# Patient Record
Sex: MALE | Race: WHITE | NOT HISPANIC OR LATINO | Employment: OTHER | URBAN - METROPOLITAN AREA
[De-identification: names, ages, dates, MRNs, and addresses within clinical notes are randomized per-mention and may not be internally consistent; named-entity substitution may affect disease eponyms.]

---

## 2017-10-10 ENCOUNTER — OFFICE VISIT (OUTPATIENT)
Dept: GASTROENTEROLOGY | Facility: CLINIC | Age: 72
End: 2017-10-10

## 2017-10-10 VITALS
DIASTOLIC BLOOD PRESSURE: 96 MMHG | HEIGHT: 70 IN | SYSTOLIC BLOOD PRESSURE: 148 MMHG | HEART RATE: 76 BPM | BODY MASS INDEX: 35.36 KG/M2 | WEIGHT: 247 LBS

## 2017-10-10 DIAGNOSIS — R10.11 RIGHT UPPER QUADRANT ABDOMINAL PAIN: Primary | ICD-10-CM

## 2017-10-10 PROCEDURE — 99213 OFFICE O/P EST LOW 20 MIN: CPT | Performed by: INTERNAL MEDICINE

## 2017-10-10 RX ORDER — ROSUVASTATIN CALCIUM 5 MG/1
TABLET, COATED ORAL
Refills: 6 | COMMUNITY
Start: 2017-09-25 | End: 2020-01-14 | Stop reason: SDUPTHER

## 2017-10-10 RX ORDER — HYDROCHLOROTHIAZIDE 25 MG/1
TABLET ORAL
Refills: 1 | Status: ON HOLD | COMMUNITY
Start: 2017-08-28 | End: 2018-10-25

## 2017-10-10 RX ORDER — FEXOFENADINE HCL 180 MG/1
180 TABLET ORAL DAILY
COMMUNITY
End: 2018-10-25

## 2017-10-10 RX ORDER — PANTOPRAZOLE SODIUM 40 MG/1
TABLET, DELAYED RELEASE ORAL
Refills: 5 | COMMUNITY
Start: 2017-09-26 | End: 2018-10-25 | Stop reason: ALTCHOICE

## 2017-10-10 NOTE — PROGRESS NOTES
Subjective   Summer Condon Jr. is a 71 y.o. male is here today for follow-up.  Chief Complaint   Patient presents with   • Abdominal Pain   • Fatigue     History of Present Illness  Gentleman with persistent right upper quadrant pain.  He even his colonoscopy was normal.  Recent CT scan demonstrated mild fatty liver but no other major abnormalities.  Patient has no dyspeptic symptoms.  However, the pain is aggravated with physical activity.    The following portions of the patient's history were reviewed and updated as appropriate: allergies, current medications, past family history, past medical history, past social history, past surgical history and problem list.    Review of Systems   Respiratory: Negative for shortness of breath.    Cardiovascular: Negative for chest pain.       Objective   Physical Exam   Constitutional: He appears well-developed and well-nourished.   Nursing note and vitals reviewed.     on physical examination he has some tenderness to light palpation over the right upper quadrants and he is very tender in his right thoracolumbar fascial junction.      Assessment/Plan   Problems Addressed this Visit        Nervous and Auditory    Right upper quadrant abdominal pain - Primary      This sounds musculoskeletal in nature to me.  I would like to get an opinion from a physical therapist.  Further workup will be dependent on their initial evaluation.

## 2018-01-22 ENCOUNTER — APPOINTMENT (OUTPATIENT)
Dept: GENERAL RADIOLOGY | Facility: HOSPITAL | Age: 73
End: 2018-01-22

## 2018-01-22 ENCOUNTER — HOSPITAL ENCOUNTER (EMERGENCY)
Facility: HOSPITAL | Age: 73
Discharge: HOME | End: 2018-01-22
Attending: EMERGENCY MEDICINE | Admitting: EMERGENCY MEDICINE

## 2018-01-22 VITALS
RESPIRATION RATE: 16 BRPM | WEIGHT: 230 LBS | HEIGHT: 69 IN | DIASTOLIC BLOOD PRESSURE: 96 MMHG | BODY MASS INDEX: 34.07 KG/M2 | TEMPERATURE: 98.7 F | SYSTOLIC BLOOD PRESSURE: 171 MMHG | OXYGEN SATURATION: 96 % | HEART RATE: 80 BPM

## 2018-01-22 DIAGNOSIS — M23.91 INTERNAL DERANGEMENT OF KNEE JOINT, RIGHT: Primary | ICD-10-CM

## 2018-01-22 PROCEDURE — 99284 EMERGENCY DEPT VISIT MOD MDM: CPT

## 2018-01-22 PROCEDURE — 73562 X-RAY EXAM OF KNEE 3: CPT

## 2018-01-22 NOTE — ED PROVIDER NOTES
" EMERGENCY DEPARTMENT ENCOUNTER    CHIEF COMPLAINT  Chief Complaint: right knee abrasion  History given by: pt  History limited by: nothing  Room Number: 38/38  PMD: Vicki Leon MD      HPI:  Pt is a 72 y.o. male who presents complaining of a right knee abrasion and pain after a mechanical fall PTA. Pt reports walking his dog who pulled abruptly causing him to trip and fall. The pain is worse when placing weight on the knee, and it \"causes his knee to buckle\". Has some small abrasions on hands.  Pt is usure of Tetanus status.    Duration:  PTA  Onset: sudden  Timing: constant  Location: right knee  Radiation: none  Quality: abrasion  Intensity/Severity: mild  Progression: unchanged  Associated Symptoms: none  Aggravating Factors: placing weight on the knee  Alleviating Factors: none  Previous Episodes: no  Treatment before arrival: none    PAST MEDICAL HISTORY  Active Ambulatory Problems     Diagnosis Date Noted   • Right upper quadrant abdominal pain 10/10/2017     Resolved Ambulatory Problems     Diagnosis Date Noted   • No Resolved Ambulatory Problems     Past Medical History:   Diagnosis Date   • Arthritis    • Cholelithiasis    • Elevated cholesterol    • GERD (gastroesophageal reflux disease)    • History of colon polyps    • Hypertension        PAST SURGICAL HISTORY  Past Surgical History:   Procedure Laterality Date   • CATARACT EXTRACTION     • CHOLECYSTECTOMY     • COLONOSCOPY  11/17/2014    Reid Yousif MD   • ELBOW ARTHROPLASTY         FAMILY HISTORY  History reviewed. No pertinent family history.    SOCIAL HISTORY  Social History     Social History   • Marital status:      Spouse name: N/A   • Number of children: N/A   • Years of education: N/A     Occupational History   • Not on file.     Social History Main Topics   • Smoking status: Former Smoker     Start date: 1968     Quit date: 1974   • Smokeless tobacco: Never Used   • Alcohol use No   • Drug use: No   • Sexual activity: Not on " file     Other Topics Concern   • Not on file     Social History Narrative       ALLERGIES  Zocor [simvastatin] and Ibuprofen    REVIEW OF SYSTEMS  Review of Systems   Constitutional: Negative for activity change, appetite change and fever.   HENT: Negative for congestion and sore throat.    Eyes: Negative.    Respiratory: Negative for cough and shortness of breath.    Cardiovascular: Negative for chest pain and leg swelling.   Gastrointestinal: Negative for abdominal pain, diarrhea and vomiting.   Endocrine: Negative.    Genitourinary: Negative for decreased urine volume and dysuria.   Musculoskeletal: Negative for neck pain.   Skin: Positive for wound (right knee abrasion). Negative for rash.   Allergic/Immunologic: Negative.    Neurological: Negative for weakness, numbness and headaches.   Hematological: Negative.    Psychiatric/Behavioral: Negative.    All other systems reviewed and are negative.      PHYSICAL EXAM  ED Triage Vitals   Temp Heart Rate Resp BP SpO2   01/22/18 0200 01/22/18 0121 01/22/18 0121 01/22/18 0121 01/22/18 0121   98.7 °F (37.1 °C) 75 16 146/109 97 %      Temp src Heart Rate Source Patient Position BP Location FiO2 (%)   01/22/18 0200 01/22/18 0206 01/22/18 0206 01/22/18 0206 --   Oral Monitor Lying Right arm        Physical Exam   Constitutional: He is oriented to person, place, and time and well-developed, well-nourished, and in no distress.   HENT:   Head: Normocephalic and atraumatic.   Eyes: EOM are normal. Pupils are equal, round, and reactive to light.   Neck: Normal range of motion. Neck supple.   Cardiovascular: Normal rate, regular rhythm and normal heart sounds.    Pulmonary/Chest: Effort normal and breath sounds normal. No respiratory distress.   Abdominal: Soft. There is no tenderness. There is no rebound and no guarding.   Musculoskeletal: Normal range of motion. He exhibits no edema.        Right knee: He exhibits effusion. He exhibits normal alignment, no LCL laxity, normal  patellar mobility and no MCL laxity. Tenderness found. Medial joint line and lateral joint line tenderness noted.   Small abrasions to the left knuckles, right elbow, and right knee.   Neurological: He is alert and oriented to person, place, and time. He has normal sensation and normal strength.   Skin: Skin is warm and dry.   Psychiatric: Mood and affect normal.   Nursing note and vitals reviewed.      RADIOLOGY  XR Knee 3 View Right   Preliminary Result   No acute fracture or dislocation.                   I ordered the above noted radiological studies. Interpreted by radiologist. Reviewed by me in PACS.       PROCEDURES  Procedures      PROGRESS AND CONSULTS  ED Course     0138 - Ordered XR Right Knee for further evaluation.    0226 - Notified pt of imaging which did not show a fracture. Discussed plan to discharge the pt with a knee immobilizer. He should follow up with an orthopedist in a couple days. Pt understands and agrees with plan, all questions addressed.      MEDICAL DECISION MAKING  Results were reviewed/discussed with the patient and they were also made aware of online access. Pt also made aware that some labs, such as cultures, will not be resulted during ER visit and follow up with PMD is necessary.     MDM  Number of Diagnoses or Management Options     Amount and/or Complexity of Data Reviewed  Tests in the radiology section of CPT®: ordered and reviewed (XR Right Knee - no acute fracture)  Decide to obtain previous medical records or to obtain history from someone other than the patient: yes  Review and summarize past medical records: yes (No admissions)           DIAGNOSIS  Final diagnoses:   Internal derangement of knee joint, right       DISPOSITION  DISCHARGE    Patient discharged in stable condition.    Reviewed implications of results, diagnosis, meds, responsibility to follow up, warning signs and symptoms of possible worsening, potential complications and reasons to return to  ER.    Patient/Family voiced understanding of above instructions.    Discussed plan for discharge, as there is no emergent indication for admission.  Pt/family is agreeable and understands need for follow up and repeat testing.  Pt is aware that discharge does not mean that nothing is wrong but it indicates no emergency is present that requires admission and they must continue care with follow-up as given below or physician of their choice.     FOLLOW-UP  REANNA Walker MD  [address] [facility]  [address]  [facility] [id number] [phone]    Schedule an appointment as soon as possible for a visit in 2 days  For follow-up    Eliceo López MD  [address]  [address]  [facility] [address]  [phone]    Schedule an appointment as soon as possible for a visit           Medication List      Stop          fexofenadine 180 MG tablet   Commonly known as:  ALLEGRA       pantoprazole 40 MG EC tablet   Commonly known as:  PROTONIX               Latest Documented Vital Signs:  As of 3:02 AM  BP- (!) 171/104 HR- 75 Temp- 98.7 °F (37.1 °C) (Oral) O2 sat- 97%    --  Documentation assistance provided by elza Carrasco for Dr. Funes.  Information recorded by the scribe was done at my direction and has been verified and validated by me.     Mario Carrasco  01/22/18 0429       Grayson Funes MD  01/24/18 9804

## 2018-01-22 NOTE — ED TRIAGE NOTES
PT arrived via ambulance and reports mechanical fall resulting in R knee pain and multiple abrasions to L hand and right elbow. States he's unable to stand or walk at all. PT is awake, alert, oriented, in no apparent distress. PT is placed in wheelchair and first look completed.

## 2018-10-25 ENCOUNTER — OFFICE VISIT (OUTPATIENT)
Dept: GASTROENTEROLOGY | Facility: CLINIC | Age: 73
End: 2018-10-25

## 2018-10-25 ENCOUNTER — HOSPITAL ENCOUNTER (OUTPATIENT)
Facility: HOSPITAL | Age: 73
Setting detail: SURGERY ADMIT
End: 2018-10-25
Attending: INTERNAL MEDICINE | Admitting: INTERNAL MEDICINE

## 2018-10-25 ENCOUNTER — HOSPITAL ENCOUNTER (INPATIENT)
Facility: HOSPITAL | Age: 73
LOS: 2 days | Discharge: HOME | End: 2018-10-27
Attending: INTERNAL MEDICINE | Admitting: INTERNAL MEDICINE

## 2018-10-25 VITALS
WEIGHT: 243 LBS | BODY MASS INDEX: 35.99 KG/M2 | HEART RATE: 100 BPM | SYSTOLIC BLOOD PRESSURE: 142 MMHG | HEIGHT: 69 IN | DIASTOLIC BLOOD PRESSURE: 100 MMHG

## 2018-10-25 DIAGNOSIS — K62.5 HEMORRHAGE OF ANUS AND RECTUM: Primary | ICD-10-CM

## 2018-10-25 PROBLEM — K92.2 GI BLEED: Status: ACTIVE | Noted: 2018-10-25

## 2018-10-25 LAB
ABO GROUP BLD: NORMAL
ALBUMIN SERPL-MCNC: 3.9 G/DL (ref 3.5–5.2)
ALBUMIN/GLOB SERPL: 1.4 G/DL
ALP SERPL-CCNC: 51 U/L (ref 39–117)
ALT SERPL W P-5'-P-CCNC: 21 U/L (ref 1–41)
ANION GAP SERPL CALCULATED.3IONS-SCNC: 14 MMOL/L
AST SERPL-CCNC: 21 U/L (ref 1–40)
BILIRUB SERPL-MCNC: 1.1 MG/DL (ref 0.1–1.2)
BLD GP AB SCN SERPL QL: NEGATIVE
BUN BLD-MCNC: 20 MG/DL (ref 8–23)
BUN/CREAT SERPL: 20 (ref 7–25)
CALCIUM SPEC-SCNC: 8.8 MG/DL (ref 8.6–10.5)
CHLORIDE SERPL-SCNC: 103 MMOL/L (ref 98–107)
CO2 SERPL-SCNC: 24 MMOL/L (ref 22–29)
CREAT BLD-MCNC: 1 MG/DL (ref 0.76–1.27)
DEPRECATED RDW RBC AUTO: 41.5 FL (ref 37–54)
ERYTHROCYTE [DISTWIDTH] IN BLOOD BY AUTOMATED COUNT: 12.9 % (ref 11.5–14.5)
GFR SERPL CREATININE-BSD FRML MDRD: 74 ML/MIN/1.73
GLOBULIN UR ELPH-MCNC: 2.7 GM/DL
GLUCOSE BLD-MCNC: 173 MG/DL (ref 65–99)
HCT VFR BLD AUTO: 39.5 % (ref 40.4–52.2)
HGB BLD-MCNC: 12.6 G/DL (ref 13.7–17.6)
INR PPP: 1.11 (ref 0.9–1.1)
MCH RBC QN AUTO: 27.9 PG (ref 27–32.7)
MCHC RBC AUTO-ENTMCNC: 31.9 G/DL (ref 32.6–36.4)
MCV RBC AUTO: 87.6 FL (ref 79.8–96.2)
PLATELET # BLD AUTO: 211 10*3/MM3 (ref 140–500)
PMV BLD AUTO: 10.7 FL (ref 6–12)
POTASSIUM BLD-SCNC: 3.4 MMOL/L (ref 3.5–5.2)
PROT SERPL-MCNC: 6.6 G/DL (ref 6–8.5)
PROTHROMBIN TIME: 14.1 SECONDS (ref 11.7–14.2)
RBC # BLD AUTO: 4.51 10*6/MM3 (ref 4.6–6)
RH BLD: POSITIVE
SODIUM BLD-SCNC: 141 MMOL/L (ref 136–145)
T&S EXPIRATION DATE: NORMAL
WBC NRBC COR # BLD: 8.38 10*3/MM3 (ref 4.5–10.7)

## 2018-10-25 PROCEDURE — 93010 ELECTROCARDIOGRAM REPORT: CPT | Performed by: INTERNAL MEDICINE

## 2018-10-25 PROCEDURE — 85018 HEMOGLOBIN: CPT | Performed by: INTERNAL MEDICINE

## 2018-10-25 PROCEDURE — 86850 RBC ANTIBODY SCREEN: CPT | Performed by: INTERNAL MEDICINE

## 2018-10-25 PROCEDURE — 86901 BLOOD TYPING SEROLOGIC RH(D): CPT | Performed by: INTERNAL MEDICINE

## 2018-10-25 PROCEDURE — 99223 1ST HOSP IP/OBS HIGH 75: CPT | Performed by: INTERNAL MEDICINE

## 2018-10-25 PROCEDURE — 85610 PROTHROMBIN TIME: CPT | Performed by: INTERNAL MEDICINE

## 2018-10-25 PROCEDURE — 93005 ELECTROCARDIOGRAM TRACING: CPT | Performed by: INTERNAL MEDICINE

## 2018-10-25 PROCEDURE — 80053 COMPREHEN METABOLIC PANEL: CPT | Performed by: INTERNAL MEDICINE

## 2018-10-25 PROCEDURE — 85027 COMPLETE CBC AUTOMATED: CPT | Performed by: INTERNAL MEDICINE

## 2018-10-25 PROCEDURE — 86900 BLOOD TYPING SEROLOGIC ABO: CPT | Performed by: INTERNAL MEDICINE

## 2018-10-25 PROCEDURE — 85014 HEMATOCRIT: CPT | Performed by: INTERNAL MEDICINE

## 2018-10-25 RX ORDER — ROSUVASTATIN CALCIUM 5 MG/1
5 TABLET, COATED ORAL DAILY
Status: DISCONTINUED | OUTPATIENT
Start: 2018-10-25 | End: 2018-10-27 | Stop reason: HOSPADM

## 2018-10-25 RX ORDER — TRAMADOL HYDROCHLORIDE 50 MG/1
50 TABLET ORAL EVERY 6 HOURS PRN
COMMUNITY
End: 2021-04-02

## 2018-10-25 RX ORDER — SODIUM CHLORIDE 0.9 % (FLUSH) 0.9 %
3-10 SYRINGE (ML) INJECTION AS NEEDED
Status: DISCONTINUED | OUTPATIENT
Start: 2018-10-25 | End: 2018-10-27 | Stop reason: HOSPADM

## 2018-10-25 RX ORDER — SODIUM CHLORIDE 0.9 % (FLUSH) 0.9 %
3 SYRINGE (ML) INJECTION EVERY 12 HOURS SCHEDULED
Status: DISCONTINUED | OUTPATIENT
Start: 2018-10-25 | End: 2018-10-27 | Stop reason: HOSPADM

## 2018-10-25 RX ORDER — MELOXICAM 15 MG/1
TABLET ORAL
Status: ON HOLD | COMMUNITY
Start: 2018-10-25 | End: 2018-10-25

## 2018-10-25 RX ORDER — NITROGLYCERIN 0.4 MG/1
0.4 TABLET SUBLINGUAL
Status: DISCONTINUED | OUTPATIENT
Start: 2018-10-25 | End: 2018-10-27 | Stop reason: HOSPADM

## 2018-10-25 RX ORDER — ONDANSETRON 2 MG/ML
4 INJECTION INTRAMUSCULAR; INTRAVENOUS EVERY 6 HOURS PRN
Status: DISCONTINUED | OUTPATIENT
Start: 2018-10-25 | End: 2018-10-27 | Stop reason: HOSPADM

## 2018-10-25 RX ORDER — TRAMADOL HYDROCHLORIDE 50 MG/1
50 TABLET ORAL EVERY 6 HOURS PRN
Status: DISCONTINUED | OUTPATIENT
Start: 2018-10-25 | End: 2018-10-27 | Stop reason: HOSPADM

## 2018-10-25 RX ADMIN — Medication 3 ML: at 21:11

## 2018-10-25 RX ADMIN — POLYETHYLENE GLYCOL 3350 255 G: 17 POWDER, FOR SOLUTION ORAL at 21:10

## 2018-10-25 NOTE — PROGRESS NOTES
"Arely Condon Jr. is a 72 y.o.. male is here today as a self referral.    Chief Complaint   Patient presents with   • Rectal Bleeding       History of Present Illness patient was in his usual state of health until very early this morning when he began to experience \"diarrhea\".  He looked down in the toilet bowl and noticed that it was actually just blood.  He had about 4 additional bloody stools, called my office and came in for examination.  He passed 1 large bloody stool that I witnessed.  He's never had something like this happen to him before.  He has not been orthostatic.  He's had no abdominal pain.  He has noticed that his blood pressure is been running higher than usual.  He has not had any chest pain or shortness of breath associated with this.  He has no prior history of coronary artery disease or pulmonary disease.      The following portions of the patient's history were reviewed and updated as appropriate: allergies, current medications, past family history, past medical history, past social history, past surgical history and problem list.    No current outpatient prescriptions on file.  Past Surgical History:   Procedure Laterality Date   • CATARACT EXTRACTION     • CHOLECYSTECTOMY     • COLONOSCOPY  11/17/2014    Reid Yousif MD   • ELBOW ARTHROPLASTY       History reviewed. No pertinent family history.  Past Medical History:   Diagnosis Date   • Arthritis    • Cholelithiasis    • Elevated cholesterol    • GERD (gastroesophageal reflux disease)    • Hemorrhoids    • History of colon polyps    • Hypertension        Review of Systems   Constitutional: Negative for appetite change, diaphoresis, fatigue, fever and unexpected weight change.   HENT: Negative for hearing loss, mouth sores, sore throat and trouble swallowing.    Eyes: Negative for pain and redness.   Respiratory: Negative for choking and shortness of breath.    Cardiovascular: Negative for chest pain and leg swelling. "   Gastrointestinal: Positive for anal bleeding and blood in stool. Negative for abdominal distention, abdominal pain, constipation, diarrhea, nausea, rectal pain and vomiting.   Genitourinary: Negative for flank pain and hematuria.   Musculoskeletal: Negative for arthralgias and joint swelling.   Skin: Negative for color change and rash.   Allergic/Immunologic: Negative for food allergies and immunocompromised state.   Neurological: Negative for dizziness, seizures and headaches.   Hematological: Does not bruise/bleed easily.   Psychiatric/Behavioral: Negative for confusion, sleep disturbance and suicidal ideas. The patient is not nervous/anxious.        Objective   Physical Exam   Constitutional: He is oriented to person, place, and time. He appears well-developed and well-nourished.   HENT:   Head: Normocephalic and atraumatic.   Nose: Nose normal.   Eyes: Pupils are equal, round, and reactive to light. Conjunctivae are normal.   Neck: Normal range of motion. Neck supple. No thyromegaly present.   Cardiovascular: Normal heart sounds.  Exam reveals no gallop and no friction rub.    No murmur heard.  Pulmonary/Chest: Effort normal and breath sounds normal.   Abdominal: Soft. Bowel sounds are normal. He exhibits no distension and no mass. There is no tenderness.   Musculoskeletal: He exhibits no edema.   Lymphadenopathy:     He has no cervical adenopathy.   Neurological: He is alert and oriented to person, place, and time.   Skin: No rash noted. No erythema.   Psychiatric: He has a normal mood and affect. His behavior is normal.   Nursing note and vitals reviewed.      Pertinent laboratory results were reviewed. , Pertinent old records were reviewed.  and Pertinent medical tests were reviewed.     Assessment/Plan   Problems Addressed this Visit        Digestive    Hemorrhage of anus and rectum - Primary    Relevant Orders    Case Request (Completed)        He is having significant GI bleeding.  This is most likely  diverticular in nature.  I will admit him at which time he will have a database established and we will begin a colonoscopy preparation.  He is tentatively on schedule for early tomorrow morning.  Because of the nature of the bleeding we would consider this potentially serious and he will be admitted to a monitored bed

## 2018-10-26 ENCOUNTER — ANESTHESIA EVENT (OUTPATIENT)
Dept: GASTROENTEROLOGY | Facility: HOSPITAL | Age: 73
End: 2018-10-26

## 2018-10-26 ENCOUNTER — ANESTHESIA (OUTPATIENT)
Dept: GASTROENTEROLOGY | Facility: HOSPITAL | Age: 73
End: 2018-10-26

## 2018-10-26 LAB
HCT VFR BLD AUTO: 32.4 % (ref 40.4–52.2)
HCT VFR BLD AUTO: 33.7 % (ref 40.4–52.2)
HCT VFR BLD AUTO: 34.6 % (ref 40.4–52.2)
HGB BLD-MCNC: 10.6 G/DL (ref 13.7–17.6)
HGB BLD-MCNC: 11.3 G/DL (ref 13.7–17.6)
HGB BLD-MCNC: 11.7 G/DL (ref 13.7–17.6)

## 2018-10-26 PROCEDURE — 0DJD8ZZ INSPECTION OF LOWER INTESTINAL TRACT, VIA NATURAL OR ARTIFICIAL OPENING ENDOSCOPIC: ICD-10-PCS | Performed by: INTERNAL MEDICINE

## 2018-10-26 PROCEDURE — 45378 DIAGNOSTIC COLONOSCOPY: CPT | Performed by: INTERNAL MEDICINE

## 2018-10-26 PROCEDURE — 85018 HEMOGLOBIN: CPT | Performed by: INTERNAL MEDICINE

## 2018-10-26 PROCEDURE — 85014 HEMATOCRIT: CPT | Performed by: INTERNAL MEDICINE

## 2018-10-26 PROCEDURE — 25010000002 PROPOFOL 10 MG/ML EMULSION: Performed by: ANESTHESIOLOGY

## 2018-10-26 RX ORDER — PROPOFOL 10 MG/ML
VIAL (ML) INTRAVENOUS AS NEEDED
Status: DISCONTINUED | OUTPATIENT
Start: 2018-10-26 | End: 2018-10-26 | Stop reason: SURG

## 2018-10-26 RX ORDER — SODIUM CHLORIDE, SODIUM LACTATE, POTASSIUM CHLORIDE, CALCIUM CHLORIDE 600; 310; 30; 20 MG/100ML; MG/100ML; MG/100ML; MG/100ML
1000 INJECTION, SOLUTION INTRAVENOUS CONTINUOUS
Status: DISCONTINUED | OUTPATIENT
Start: 2018-10-26 | End: 2018-10-27 | Stop reason: HOSPADM

## 2018-10-26 RX ORDER — SODIUM CHLORIDE 0.9 % (FLUSH) 0.9 %
3 SYRINGE (ML) INJECTION AS NEEDED
Status: DISCONTINUED | OUTPATIENT
Start: 2018-10-26 | End: 2018-10-26

## 2018-10-26 RX ORDER — LIDOCAINE HYDROCHLORIDE 20 MG/ML
INJECTION, SOLUTION INFILTRATION; PERINEURAL AS NEEDED
Status: DISCONTINUED | OUTPATIENT
Start: 2018-10-26 | End: 2018-10-26 | Stop reason: SURG

## 2018-10-26 RX ORDER — PROPOFOL 10 MG/ML
VIAL (ML) INTRAVENOUS CONTINUOUS PRN
Status: DISCONTINUED | OUTPATIENT
Start: 2018-10-26 | End: 2018-10-26 | Stop reason: SURG

## 2018-10-26 RX ADMIN — ROSUVASTATIN CALCIUM 5 MG: 5 TABLET, FILM COATED ORAL at 08:14

## 2018-10-26 RX ADMIN — Medication 3 ML: at 08:15

## 2018-10-26 RX ADMIN — Medication 3 ML: at 22:02

## 2018-10-26 RX ADMIN — LIDOCAINE HYDROCHLORIDE 60 MG: 20 INJECTION, SOLUTION INFILTRATION; PERINEURAL at 09:59

## 2018-10-26 RX ADMIN — PROPOFOL 100 MG: 10 INJECTION, EMULSION INTRAVENOUS at 09:55

## 2018-10-26 RX ADMIN — PROPOFOL 100 MCG/KG/MIN: 10 INJECTION, EMULSION INTRAVENOUS at 09:55

## 2018-10-26 RX ADMIN — SODIUM CHLORIDE, POTASSIUM CHLORIDE, SODIUM LACTATE AND CALCIUM CHLORIDE 1000 ML: 600; 310; 30; 20 INJECTION, SOLUTION INTRAVENOUS at 09:18

## 2018-10-26 NOTE — ANESTHESIA PREPROCEDURE EVALUATION
Anesthesia Evaluation     Patient summary reviewed and Nursing notes reviewed                Airway   Mallampati: I  TM distance: >3 FB  Neck ROM: full  No difficulty expected  Dental - normal exam     Pulmonary - negative pulmonary ROS and normal exam   Cardiovascular - normal exam    (+) hypertension, hyperlipidemia,       Neuro/Psych- negative ROS  GI/Hepatic/Renal/Endo    (+)  GERD, GI bleeding,     Musculoskeletal     Abdominal  - normal exam    Bowel sounds: normal.   Substance History - negative use     OB/GYN negative ob/gyn ROS         Other   (+) arthritis                   Anesthesia Plan    ASA 3     MAC     Anesthetic plan, all risks, benefits, and alternatives have been provided, discussed and informed consent has been obtained with: patient.

## 2018-10-26 NOTE — ANESTHESIA POSTPROCEDURE EVALUATION
"Patient: Summer Condon Jr.    Procedure Summary     Date:  10/26/18 Room / Location:  [facility] ENDOSCOPY 1 /  [facility]    Anesthesia Start:  0959 Anesthesia Stop:  1022    Procedure:  COLONOSCOPY TO CECUM AND INTO TERMINAL ILEUM (N/A ) Diagnosis:       Hemorrhage of anus and rectum      (Hemorrhage of anus and rectum [K62.5])    Surgeon:  Reid Yousif MD Provider:  Jhonny Ferrell MD    Anesthesia Type:  MAC ASA Status:  3          Anesthesia Type: MAC  Last vitals  BP   124/77 (10/26/18 1040)   Temp   36.5 °C (97.7 °F) (10/26/18 0908)   Pulse   70 (10/26/18 1040)   Resp   16 (10/26/18 1040)     SpO2   96 % (10/26/18 1040)     Post Anesthesia Care and Evaluation    Patient location during evaluation: PACU  Patient participation: complete - patient participated  Level of consciousness: awake  Pain score: 0  Pain management: adequate  Airway patency: patent  Anesthetic complications: No anesthetic complications  PONV Status: none  Cardiovascular status: acceptable  Respiratory status: acceptable  Hydration status: acceptable    Comments: /77 (BP Location: Right arm)   Pulse 70   Temp 36.5 °C (97.7 °F)   Resp 16   Ht 175.3 cm (69.02\")   Wt 107 kg (236 lb 7 oz)   SpO2 96%   BMI 34.90 kg/m²       "

## 2018-10-27 VITALS
RESPIRATION RATE: 16 BRPM | TEMPERATURE: 98 F | DIASTOLIC BLOOD PRESSURE: 68 MMHG | OXYGEN SATURATION: 98 % | BODY MASS INDEX: 35.02 KG/M2 | SYSTOLIC BLOOD PRESSURE: 145 MMHG | HEART RATE: 69 BPM | HEIGHT: 69 IN | WEIGHT: 236.44 LBS

## 2018-10-27 LAB
BASOPHILS # BLD AUTO: 0.06 10*3/MM3 (ref 0–0.2)
BASOPHILS NFR BLD AUTO: 0.8 % (ref 0–1.5)
DEPRECATED RDW RBC AUTO: 41.1 FL (ref 37–54)
EOSINOPHIL # BLD AUTO: 0.13 10*3/MM3 (ref 0–0.7)
EOSINOPHIL NFR BLD AUTO: 1.7 % (ref 0.3–6.2)
ERYTHROCYTE [DISTWIDTH] IN BLOOD BY AUTOMATED COUNT: 13.2 % (ref 11.5–14.5)
HCT VFR BLD AUTO: 27.3 % (ref 40.4–52.2)
HCT VFR BLD AUTO: 31.6 % (ref 40.4–52.2)
HGB BLD-MCNC: 10.4 G/DL (ref 13.7–17.6)
HGB BLD-MCNC: 9.4 G/DL (ref 13.7–17.6)
IMM GRANULOCYTES # BLD: 0.06 10*3/MM3 (ref 0–0.03)
IMM GRANULOCYTES NFR BLD: 0.8 % (ref 0–0.5)
LYMPHOCYTES # BLD AUTO: 2.84 10*3/MM3 (ref 0.9–4.8)
LYMPHOCYTES NFR BLD AUTO: 36.1 % (ref 19.6–45.3)
MCH RBC QN AUTO: 28.4 PG (ref 27–32.7)
MCHC RBC AUTO-ENTMCNC: 32.9 G/DL (ref 32.6–36.4)
MCV RBC AUTO: 86.3 FL (ref 79.8–96.2)
MONOCYTES # BLD AUTO: 0.59 10*3/MM3 (ref 0.2–1.2)
MONOCYTES NFR BLD AUTO: 7.5 % (ref 5–12)
NEUTROPHILS # BLD AUTO: 4.19 10*3/MM3 (ref 1.9–8.1)
NEUTROPHILS NFR BLD AUTO: 53.1 % (ref 42.7–76)
NRBC BLD MANUAL-RTO: 0 /100 WBC (ref 0–0)
PLAT MORPH BLD: NORMAL
PLATELET # BLD AUTO: 219 10*3/MM3 (ref 140–500)
PMV BLD AUTO: 10.7 FL (ref 6–12)
POLYCHROMASIA BLD QL SMEAR: NORMAL
RBC # BLD AUTO: 3.66 10*6/MM3 (ref 4.6–6)
WBC MORPH BLD: NORMAL
WBC NRBC COR # BLD: 7.87 10*3/MM3 (ref 4.5–10.7)

## 2018-10-27 PROCEDURE — 99238 HOSP IP/OBS DSCHRG MGMT 30/<: CPT | Performed by: INTERNAL MEDICINE

## 2018-10-27 PROCEDURE — 85025 COMPLETE CBC W/AUTO DIFF WBC: CPT | Performed by: INTERNAL MEDICINE

## 2018-10-27 PROCEDURE — 85007 BL SMEAR W/DIFF WBC COUNT: CPT | Performed by: INTERNAL MEDICINE

## 2018-10-27 PROCEDURE — 85014 HEMATOCRIT: CPT | Performed by: INTERNAL MEDICINE

## 2018-10-27 PROCEDURE — 85018 HEMOGLOBIN: CPT | Performed by: INTERNAL MEDICINE

## 2018-10-27 RX ADMIN — Medication 3 ML: at 09:30

## 2018-10-27 RX ADMIN — ROSUVASTATIN CALCIUM 5 MG: 5 TABLET, FILM COATED ORAL at 09:29

## 2019-10-01 DIAGNOSIS — I10 HYPERTENSION, UNSPECIFIED TYPE: Primary | ICD-10-CM

## 2019-10-01 RX ORDER — HYDROCHLOROTHIAZIDE 25 MG/1
25 TABLET ORAL DAILY
Qty: 30 TABLET | Refills: 1 | Status: SHIPPED | OUTPATIENT
Start: 2019-10-01 | End: 2019-12-14 | Stop reason: SDUPTHER

## 2019-12-11 DIAGNOSIS — I10 HYPERTENSION, UNSPECIFIED TYPE: ICD-10-CM

## 2019-12-11 RX ORDER — HYDROCHLOROTHIAZIDE 25 MG/1
TABLET ORAL
Qty: 30 TABLET | Refills: 1 | OUTPATIENT
Start: 2019-12-11

## 2019-12-12 DIAGNOSIS — I10 HYPERTENSION, UNSPECIFIED TYPE: ICD-10-CM

## 2019-12-12 RX ORDER — HYDROCHLOROTHIAZIDE 25 MG/1
TABLET ORAL
Qty: 30 TABLET | Refills: 1 | OUTPATIENT
Start: 2019-12-12

## 2019-12-14 DIAGNOSIS — I10 HYPERTENSION, UNSPECIFIED TYPE: ICD-10-CM

## 2019-12-16 RX ORDER — HYDROCHLOROTHIAZIDE 25 MG/1
TABLET ORAL
Qty: 30 TABLET | Refills: 0 | Status: SHIPPED | OUTPATIENT
Start: 2019-12-16 | End: 2020-01-14 | Stop reason: SDUPTHER

## 2020-01-02 DIAGNOSIS — I10 HYPERTENSION, UNSPECIFIED TYPE: ICD-10-CM

## 2020-01-02 RX ORDER — HYDROCHLOROTHIAZIDE 25 MG/1
TABLET ORAL
Qty: 30 TABLET | Refills: 0 | OUTPATIENT
Start: 2020-01-02

## 2020-01-14 ENCOUNTER — TELEPHONE (OUTPATIENT)
Dept: FAMILY MEDICINE CLINIC | Facility: CLINIC | Age: 75
End: 2020-01-14

## 2020-01-14 DIAGNOSIS — I10 HYPERTENSION, UNSPECIFIED TYPE: ICD-10-CM

## 2020-01-14 RX ORDER — HYDROCHLOROTHIAZIDE 25 MG/1
25 TABLET ORAL DAILY
Qty: 30 TABLET | Refills: 0 | Status: SHIPPED | OUTPATIENT
Start: 2020-01-14 | End: 2020-01-27 | Stop reason: SDUPTHER

## 2020-01-14 RX ORDER — ROSUVASTATIN CALCIUM 5 MG/1
5 TABLET, COATED ORAL
Qty: 30 TABLET | Refills: 6 | Status: SHIPPED | OUTPATIENT
Start: 2020-01-14 | End: 2020-01-27 | Stop reason: SDUPTHER

## 2020-01-14 NOTE — TELEPHONE ENCOUNTER
Patient has appt & wants to know if we can send in refill for his hydrochlorothiazide 25 mg once daily & rosuvastatin 5 mg [facility]

## 2020-01-27 ENCOUNTER — OFFICE VISIT (OUTPATIENT)
Dept: FAMILY MEDICINE CLINIC | Facility: CLINIC | Age: 75
End: 2020-01-27

## 2020-01-27 VITALS
TEMPERATURE: 98.3 F | DIASTOLIC BLOOD PRESSURE: 84 MMHG | OXYGEN SATURATION: 97 % | HEIGHT: 69 IN | BODY MASS INDEX: 37.03 KG/M2 | SYSTOLIC BLOOD PRESSURE: 132 MMHG | HEART RATE: 72 BPM | WEIGHT: 250 LBS

## 2020-01-27 DIAGNOSIS — E66.01 MORBIDLY OBESE (HCC): ICD-10-CM

## 2020-01-27 DIAGNOSIS — I10 HYPERTENSION, UNSPECIFIED TYPE: Primary | ICD-10-CM

## 2020-01-27 DIAGNOSIS — E78.5 HYPERLIPIDEMIA, UNSPECIFIED HYPERLIPIDEMIA TYPE: ICD-10-CM

## 2020-01-27 DIAGNOSIS — Z79.899 HIGH RISK MEDICATION USE: ICD-10-CM

## 2020-01-27 DIAGNOSIS — R73.03 PREDIABETES: ICD-10-CM

## 2020-01-27 PROCEDURE — 99213 OFFICE O/P EST LOW 20 MIN: CPT | Performed by: FAMILY MEDICINE

## 2020-01-27 RX ORDER — ROSUVASTATIN CALCIUM 5 MG/1
5 TABLET, COATED ORAL
Qty: 30 TABLET | Refills: 6 | Status: SHIPPED | OUTPATIENT
Start: 2020-01-27 | End: 2020-01-27

## 2020-01-27 RX ORDER — ROSUVASTATIN CALCIUM 5 MG/1
5 TABLET, COATED ORAL
Qty: 90 TABLET | Refills: 6 | Status: SHIPPED | OUTPATIENT
Start: 2020-01-27 | End: 2020-05-14 | Stop reason: SDUPTHER

## 2020-01-27 RX ORDER — HYDROCHLOROTHIAZIDE 25 MG/1
25 TABLET ORAL DAILY
Qty: 90 TABLET | Refills: 11 | Status: SHIPPED | OUTPATIENT
Start: 2020-01-27 | End: 2020-05-14

## 2020-01-27 RX ORDER — HYDROCHLOROTHIAZIDE 25 MG/1
25 TABLET ORAL DAILY
Qty: 30 TABLET | Refills: 0 | Status: SHIPPED | OUTPATIENT
Start: 2020-01-27 | End: 2020-01-27

## 2020-01-27 NOTE — PROGRESS NOTES
Subjective   Summer Condon Jr. is a 74 y.o. male.     Chief Complaint   Patient presents with   • Annual Exam   • Hypertension       Hypertension   This is a chronic problem. The current episode started more than 1 year ago. The problem is unchanged. The problem is controlled. Pertinent negatives include no blurred vision, chest pain, palpitations or shortness of breath.   Hyperlipidemia   This is a chronic problem. The problem is controlled. Recent lipid tests were reviewed and are normal. Pertinent negatives include no chest pain or shortness of breath.          The following portions of the patient's history were reviewed and updated as appropriate: allergies, current medications, past family history, past medical history, past social history, past surgical history and problem list.    Past Medical History:   Diagnosis Date   • Arthritis    • Cholelithiasis    • Elevated cholesterol    • GERD (gastroesophageal reflux disease)    • Hemorrhoids    • History of colon polyps    • Hypertension        Past Surgical History:   Procedure Laterality Date   • CATARACT EXTRACTION     • CHOLECYSTECTOMY     • COLONOSCOPY  2014    Reid Yousif MD   • COLONOSCOPY N/A 10/26/2018    Procedure: COLONOSCOPY TO CECUM AND INTO TERMINAL ILEUM;  Surgeon: Reid Yousif MD;  Location: [facility] ENDOSCOPY;  Service: Gastroenterology   • ELBOW ARTHROPLASTY         History reviewed. No pertinent family history.    Social History     Socioeconomic History   • Marital status:      Spouse name: Not on file   • Number of children: Not on file   • Years of education: Not on file   • Highest education level: Not on file   Tobacco Use   • Smoking status: Former Smoker     Start date:      Last attempt to quit:      Years since quittin.1   • Smokeless tobacco: Never Used   Substance and Sexual Activity   • Alcohol use: Yes     Alcohol/week: 1.0 standard drinks     Types: 1 Cans of beer per week   • Drug use: No   •  "Sexual activity: Not Currently     Partners: Female       Current Outpatient Medications on File Prior to Visit   Medication Sig Dispense Refill   • traMADol (ULTRAM) 50 MG tablet Take 50 mg by mouth Every 6 (Six) Hours As Needed for Moderate Pain .     • [DISCONTINUED] hydroCHLOROthiazide (HYDRODIURIL) 25 MG tablet Take 1 tablet by mouth Daily. 30 tablet 0   • [DISCONTINUED] rosuvastatin (CRESTOR) 5 MG tablet Take 1 tablet by mouth every night at bedtime. 30 tablet 6     No current facility-administered medications on file prior to visit.        Review of Systems   Constitutional: Negative for fatigue and unexpected weight gain.   Eyes: Negative for blurred vision.   Respiratory: Negative for cough, chest tightness and shortness of breath.    Cardiovascular: Negative for chest pain, palpitations and leg swelling.   Gastrointestinal: Negative for nausea and vomiting.   Endocrine: Negative for polydipsia and polyuria.   Genitourinary: Negative for frequency.   Skin: Negative for skin lesions.   Neurological: Negative for dizziness, light-headedness, numbness and headache.       No results found for this or any previous visit (from the past 4704 hour(s)).  Objective   Vitals:    01/27/20 1448   BP: 132/84   Pulse: 72   Temp: 98.3 °F (36.8 °C)   SpO2: 97%   Weight: 113 kg (250 lb)   Height: 175.3 cm (69\")     Body mass index is 36.92 kg/m².  Physical Exam   Constitutional: He appears well-developed and well-nourished. No distress.   Cardiovascular: Normal rate and regular rhythm.   Pulmonary/Chest: Effort normal and breath sounds normal. No respiratory distress. He has no wheezes.   Skin: He is not diaphoretic.   Nursing note and vitals reviewed.        Assessment/Plan   Summer was seen today for annual exam and hypertension.    Diagnoses and all orders for this visit:    Hypertension, unspecified type  -     hydroCHLOROthiazide (HYDRODIURIL) 25 MG tablet; Take 1 tablet by mouth Daily.  -     Comprehensive Metabolic " Panel  -     Lipid Panel  -     CBC & Differential    Morbidly obese (CMS/[facility])    Hyperlipidemia, unspecified hyperlipidemia type  -     rosuvastatin (CRESTOR) 5 MG tablet; Take 1 tablet by mouth every night at bedtime.  -     Comprehensive Metabolic Panel  -     Lipid Panel  -     CBC & Differential    High risk medication use    Prediabetes  -     Hemoglobin A1c      Return in about 3 months (around 4/27/2020) for Medicare Wellness.

## 2020-01-28 ENCOUNTER — OFFICE VISIT (OUTPATIENT)
Dept: FAMILY MEDICINE CLINIC | Facility: CLINIC | Age: 75
End: 2020-01-28

## 2020-01-28 VITALS
HEART RATE: 69 BPM | DIASTOLIC BLOOD PRESSURE: 82 MMHG | BODY MASS INDEX: 37.1 KG/M2 | TEMPERATURE: 97.5 F | OXYGEN SATURATION: 93 % | HEIGHT: 69 IN | WEIGHT: 250.5 LBS | SYSTOLIC BLOOD PRESSURE: 128 MMHG

## 2020-01-28 DIAGNOSIS — E11.9 TYPE 2 DIABETES MELLITUS WITHOUT COMPLICATION, WITHOUT LONG-TERM CURRENT USE OF INSULIN (HCC): Primary | ICD-10-CM

## 2020-01-28 LAB
ALBUMIN SERPL-MCNC: 4.7 G/DL (ref 3.5–5.2)
ALBUMIN/GLOB SERPL: 1.8 G/DL
ALP SERPL-CCNC: 78 U/L (ref 39–117)
ALT SERPL-CCNC: 17 U/L (ref 1–41)
AST SERPL-CCNC: 18 U/L (ref 1–40)
BASOPHILS # BLD AUTO: 0.08 10*3/MM3 (ref 0–0.2)
BASOPHILS NFR BLD AUTO: 1 % (ref 0–1.5)
BILIRUB SERPL-MCNC: 1.1 MG/DL (ref 0.2–1.2)
BUN SERPL-MCNC: 17 MG/DL (ref 8–23)
BUN/CREAT SERPL: 16.7 (ref 7–25)
CALCIUM SERPL-MCNC: 10 MG/DL (ref 8.6–10.5)
CHLORIDE SERPL-SCNC: 95 MMOL/L (ref 98–107)
CHOLEST SERPL-MCNC: 188 MG/DL (ref 0–200)
CO2 SERPL-SCNC: 30.4 MMOL/L (ref 22–29)
CREAT SERPL-MCNC: 1.02 MG/DL (ref 0.76–1.27)
EOSINOPHIL # BLD AUTO: 0.18 10*3/MM3 (ref 0–0.4)
EOSINOPHIL NFR BLD AUTO: 2.4 % (ref 0.3–6.2)
ERYTHROCYTE [DISTWIDTH] IN BLOOD BY AUTOMATED COUNT: 12.3 % (ref 12.3–15.4)
GLOBULIN SER CALC-MCNC: 2.6 GM/DL
GLUCOSE SERPL-MCNC: 111 MG/DL (ref 65–99)
HBA1C MFR BLD: 7.4 % (ref 4.8–5.6)
HCT VFR BLD AUTO: 48.2 % (ref 37.5–51)
HDLC SERPL-MCNC: 44 MG/DL (ref 40–60)
HGB BLD-MCNC: 16.1 G/DL (ref 13–17.7)
IMM GRANULOCYTES # BLD AUTO: 0.04 10*3/MM3 (ref 0–0.05)
IMM GRANULOCYTES NFR BLD AUTO: 0.5 % (ref 0–0.5)
LDLC SERPL CALC-MCNC: 95 MG/DL (ref 0–100)
LYMPHOCYTES # BLD AUTO: 2.29 10*3/MM3 (ref 0.7–3.1)
LYMPHOCYTES NFR BLD AUTO: 30 % (ref 19.6–45.3)
MCH RBC QN AUTO: 28.6 PG (ref 26.6–33)
MCHC RBC AUTO-ENTMCNC: 33.4 G/DL (ref 31.5–35.7)
MCV RBC AUTO: 85.6 FL (ref 79–97)
MONOCYTES # BLD AUTO: 0.64 10*3/MM3 (ref 0.1–0.9)
MONOCYTES NFR BLD AUTO: 8.4 % (ref 5–12)
NEUTROPHILS # BLD AUTO: 4.41 10*3/MM3 (ref 1.7–7)
NEUTROPHILS NFR BLD AUTO: 57.7 % (ref 42.7–76)
NRBC BLD AUTO-RTO: 0 /100 WBC (ref 0–0.2)
PLATELET # BLD AUTO: 262 10*3/MM3 (ref 140–450)
POTASSIUM SERPL-SCNC: 4.3 MMOL/L (ref 3.5–5.2)
PROT SERPL-MCNC: 7.3 G/DL (ref 6–8.5)
RBC # BLD AUTO: 5.63 10*6/MM3 (ref 4.14–5.8)
SODIUM SERPL-SCNC: 140 MMOL/L (ref 136–145)
TRIGL SERPL-MCNC: 244 MG/DL (ref 0–150)
VLDLC SERPL CALC-MCNC: 48.8 MG/DL
WBC # BLD AUTO: 7.64 10*3/MM3 (ref 3.4–10.8)

## 2020-01-28 PROCEDURE — 99214 OFFICE O/P EST MOD 30 MIN: CPT | Performed by: FAMILY MEDICINE

## 2020-01-28 NOTE — PATIENT INSTRUCTIONS
Preventing Diabetes Mellitus Complications  You can take action to prevent or slow down problems that are caused by diabetes (diabetes mellitus). Following your diabetes plan and taking care of yourself can reduce your risk of serious or life-threatening complications.  What actions can I take to prevent diabetes complications?  Manage your diabetes    · Follow instructions from your health care providers about managing your diabetes. Your diabetes may be managed by a team of health care providers who can teach you how to care for yourself and can answer questions that you have.  · Educate yourself about your condition so you can make healthy choices about eating and physical activity.  · Check your blood sugar (glucose) levels as often as directed. Your health care provider will help you decide how often to check your blood glucose level depending on your treatment goals and how well you are meeting them.  · Ask your health care provider if you should take low-dose aspirin daily and what dose is recommended for you. Taking low-dose aspirin daily is recommended to help prevent cardiovascular disease.  Do not use nicotine or tobacco  Do not use any products that contain nicotine or tobacco, such as cigarettes and e-cigarettes. If you need help quitting, ask your health care provider. Nicotine raises your risk for diabetes problems. If you quit using nicotine:  · You will lower your risk for heart attack, stroke, nerve disease, and kidney disease.  · Your cholesterol and blood pressure may improve.  · Your blood circulation will improve.  Keep your blood pressure under control  Your personal target blood pressure is determined based on:  · Your age.  · Your medicines.  · How long you have had diabetes.  · Any other medical conditions you have.  To control your blood pressure:  · Follow instructions from your health care provider about meal planning, exercise, and medicines.  · Make sure your health care provider  checks your blood pressure at every medical visit.  · Monitor your blood pressure at home as told by your health care provider.    Keep your cholesterol under control  To control your cholesterol:  · Follow instructions from your health care provider about meal planning, exercise, and medicines.  · Have your cholesterol checked at least once a year.  · You may be prescribed medicine to lower cholesterol (statin). If you are not taking a statin, ask your health care provider if you should be.  Controlling your cholesterol may:  · Help prevent heart disease and stroke. These are the most common health problems for people with diabetes.  · Improve your blood flow.  Schedule and keep yearly physical exams and eye exams  Your health care provider will tell you how often you need medical visits depending on your diabetes management plan. Keep all follow-up visits as directed. This is important so possible problems can be identified early and complications can be avoided or treated.  · Every visit with your health care provider should include measuring your:  ? Weight.  ? Blood pressure.  ? Blood glucose control.  · Your A1c (hemoglobin A1c) level should be checked:  ? At least 2 times a year, if you are meeting your treatment goals.  ? 4 times a year, if you are not meeting treatment goals or if your treatment goals have changed.  · Your blood lipids (lipid profile) should be checked yearly. You should also be checked yearly for protein in your urine (urine microalbumin).  · If you have type 1 diabetes, get an eye exam 3-5 years after you are diagnosed, and then once a year after your first exam.  · If you have type 2 diabetes, get an eye exam as soon as you are diagnosed, and then once a year after your first exam.  Keep your vaccines current  It is recommended that you receive:  · A flu (influenza) vaccine every year.  · A pneumonia (pneumococcal) vaccine and a hepatitis B vaccine. If you are age 65 or older, you may  get the pneumonia vaccine as a series of two separate shots.  Ask your health care provider which other vaccines may be recommended.  Take care of your feet  Diabetes may cause you to have poor blood circulation to your legs and feet. Because of this, taking care of your feet is very important. Diabetes can cause:  · The skin on the feet to get thinner, break more easily, and heal more slowly.  · Nerve damage in your legs and feet, which results in decreased feeling. You may not notice minor injuries that could lead to serious problems.  To avoid foot problems:  · Check your skin and feet every day for cuts, bruises, redness, blisters, or sores.  · Schedule a foot exam with your health care provider once every year. This exam includes:  ? Inspecting of the structure and skin of your feet.  ? Checking the pulses and sensation in your feet.  · Make sure that your health care provider performs a visual foot exam at every medical visit.    Take care of your teeth  People with poorly controlled diabetes are more likely to have gum (periodontal) disease. Diabetes can make periodontal diseases harder to control. If not treated, periodontal diseases can lead to tooth loss. To prevent this:  · Brush your teeth twice a day.  · Floss at least once a day.  · Visit your dentist 2 times a year.  Drink responsibly  Limit alcohol intake to no more than 1 drink a day for nonpregnant women and 2 drinks a day for men. One drink equals 12 oz of beer, 5 oz of wine, or 1½ oz of hard liquor.   It is important to eat food when you drink alcohol to avoid low blood glucose (hypoglycemia). Avoid alcohol if you:  · Have a history of alcohol abuse or dependence.  · Are pregnant.  · Have liver disease, pancreatitis, advanced neuropathy, or severe hypertriglyceridemia.  Lessen stress  Living with diabetes can be stressful. When you are experiencing stress, your blood glucose may be affected in two ways:  · Stress hormones may cause your blood  glucose to rise.  · You may be distracted from taking good care of yourself.  Be aware of your stress level and make changes to help you manage challenging situations. To lower your stress levels:  · Consider joining a support group.  · Do planned relaxation or meditation.  · Do a hobby that you enjoy.  · Maintain healthy relationships.  · Exercise regularly.  · Work with your health care provider or a mental health professional.  Summary  · You can take action to prevent or slow down problems that are caused by diabetes (diabetes mellitus). Following your diabetes plan and taking care of yourself can reduce your risk of serious or life-threatening complications.  · Follow instructions from your health care providers about managing your diabetes. Your diabetes may be managed by a team of health care providers who can teach you how to care for yourself and can answer questions that you have.  · Your health care provider will tell you how often you need medical visits depending on your diabetes management plan. Keep all follow-up visits as directed. This is important so possible problems can be identified early and complications can be avoided or treated.  This information is not intended to replace advice given to you by your health care provider. Make sure you discuss any questions you have with your health care provider.  Document Released: 09/04/2012 Document Revised: 08/07/2018 Document Reviewed: 09/16/2017  [employer] Patient Education © 2019 [employer]    Diabetes Mellitus and Exercise  Exercising regularly is important for your overall health, especially when you have diabetes (diabetes mellitus). Exercising is not only about losing weight. It has many other health benefits, such as increasing muscle strength and bone density and reducing body fat and stress. This leads to improved fitness, flexibility, and endurance, all of which result in better overall health.  Exercise has additional benefits  for people with diabetes, including:  · Reducing appetite.  · Helping to lower and control blood glucose.  · Lowering blood pressure.  · Helping to control amounts of fatty substances (lipids) in the blood, such as cholesterol and triglycerides.  · Helping the body to respond better to insulin (improving insulin sensitivity).  · Reducing how much insulin the body needs.  · Decreasing the risk for heart disease by:  ? Lowering cholesterol and triglyceride levels.  ? Increasing the levels of good cholesterol.  ? Lowering blood glucose levels.  What is my activity plan?  Your health care provider or certified diabetes educator can help you make a plan for the type and frequency of exercise (activity plan) that works for you. Make sure that you:  · Do at least 150 minutes of moderate-intensity or vigorous-intensity exercise each week. This could be brisk walking, biking, or water aerobics.  ? Do stretching and strength exercises, such as yoga or weightlifting, at least 2 times a week.  ? Spread out your activity over at least 3 days of the week.  · Get some form of physical activity every day.  ? Do not go more than 2 days in a row without some kind of physical activity.  ? Avoid being inactive for more than 30 minutes at a time. Take frequent breaks to walk or stretch.  · Choose a type of exercise or activity that you enjoy, and set realistic goals.  · Start slowly, and gradually increase the intensity of your exercise over time.  What do I need to know about managing my diabetes?    · Check your blood glucose before and after exercising.  ? If your blood glucose is 240 mg/dL (13.3 mmol/L) or higher before you exercise, check your urine for ketones. If you have ketones in your urine, do not exercise until your blood glucose returns to normal.  ? If your blood glucose is 100 mg/dL (5.6 mmol/L) or lower, eat a snack containing 15-20 grams of carbohydrate. Check your blood glucose 15 minutes after the snack to make sure  that your level is above 100 mg/dL (5.6 mmol/L) before you start your exercise.  · Know the symptoms of low blood glucose (hypoglycemia) and how to treat it. Your risk for hypoglycemia increases during and after exercise. Common symptoms of hypoglycemia can include:  ? Hunger.  ? Anxiety.  ? Sweating and feeling clammy.  ? Confusion.  ? Dizziness or feeling light-headed.  ? Increased heart rate or palpitations.  ? Blurry vision.  ? Tingling or numbness around the mouth, lips, or tongue.  ? Tremors or shakes.  ? Irritability.  · Keep a rapid-acting carbohydrate snack available before, during, and after exercise to help prevent or treat hypoglycemia.  · Avoid injecting insulin into areas of the body that are going to be exercised. For example, avoid injecting insulin into:  ? The arms, when playing tennis.  ? The legs, when jogging.  · Keep records of your exercise habits. Doing this can help you and your health care provider adjust your diabetes management plan as needed. Write down:  ? Food that you eat before and after you exercise.  ? Blood glucose levels before and after you exercise.  ? The type and amount of exercise you have done.  ? When your insulin is expected to peak, if you use insulin. Avoid exercising at times when your insulin is peaking.  · When you start a new exercise or activity, work with your health care provider to make sure the activity is safe for you, and to adjust your insulin, medicines, or food intake as needed.  · Drink plenty of water while you exercise to prevent dehydration or heat stroke. Drink enough fluid to keep your urine clear or pale yellow.  Summary  · Exercising regularly is important for your overall health, especially when you have diabetes (diabetes mellitus).  · Exercising has many health benefits, such as increasing muscle strength and bone density and reducing body fat and stress.  · Your health care provider or certified diabetes educator can help you make a plan for  the type and frequency of exercise (activity plan) that works for you.  · When you start a new exercise or activity, work with your health care provider to make sure the activity is safe for you, and to adjust your insulin, medicines, or food intake as needed.  This information is not intended to replace advice given to you by your health care provider. Make sure you discuss any questions you have with your health care provider.  Document Released: 03/09/2005 Document Revised: 06/28/2018 Document Reviewed: 05/29/2017  [employer] Interactive Patient Education © 2019 [employer]

## 2020-01-28 NOTE — PROGRESS NOTES
Subjective   Summer Condon Jr. is a 74 y.o. male.     Chief Complaint   Patient presents with   • Diabetes       Diabetes   He presents for his initial diabetic visit. He has type 2 diabetes mellitus. His disease course has been worsening. There are no hypoglycemic associated symptoms. Pertinent negatives for hypoglycemia include no dizziness. There are no diabetic associated symptoms. Pertinent negatives for diabetes include no blurred vision, no chest pain, no fatigue, no polydipsia and no polyuria. There are no hypoglycemic complications. Symptoms are worsening. There are no diabetic complications. Risk factors for coronary artery disease include obesity, male sex and family history. When asked about current treatments, none were reported. He is following a diabetic diet. When asked about meal planning, he reported none. He has not had a previous visit with a dietitian. An ACE inhibitor/angiotensin II receptor blocker is not being taken.          The following portions of the patient's history were reviewed and updated as appropriate: allergies, current medications, past family history, past medical history, past social history, past surgical history and problem list.    Past Medical History:   Diagnosis Date   • Abdominal pain of multiple sites    • Abdominal pain, RUQ (right upper quadrant)    • Acute sinusitis    • Allergic rhinitis    • Arthritis    • Benign essential hypertension    • BMI 35.0-35.9,adult    • Cholelithiasis    • Condyloma acuminata    • Cough    • Elevated cholesterol    • Encounter for postoperative wound check    • Flu-like symptoms    • Former smoker     smokes 1 to 2 packs per day for 10 years   • GERD (gastroesophageal reflux disease)    • GI bleed    • Hemorrhoids    • High blood pressure    • High cholesterol    • History of allergy    • History of cataract    • History of colon polyps    • History of long-term treatment with high-risk medication    • Hyperglycemia    • Hyperlipidemia     • Hypertension    • Influenza A    • Internal hemorrhoids with complication    • Morbidly obese (CMS/HCC)    • Myalgia    • Myositis    • Nasal congestion    • No post-op complications    • Prediabetes    • Pruritus ani    • Skin lesion    • Sore throat        Past Surgical History:   Procedure Laterality Date   • CATARACT EXTRACTION     • CHOLECYSTECTOMY     • COLONOSCOPY  2014    Reid Yousif MD   • COLONOSCOPY N/A 10/26/2018    Procedure: COLONOSCOPY TO CECUM AND INTO TERMINAL ILEUM;  Surgeon: Reid Yousif MD;  Location: [facility] ENDOSCOPY;  Service: Gastroenterology   • ELBOW ARTHROPLASTY         Family History   Problem Relation Age of Onset   • Arthritis Father    • No Known Problems Other        Social History     Socioeconomic History   • Marital status:      Spouse name: Not on file   • Number of children: Not on file   • Years of education: Not on file   • Highest education level: Not on file   Tobacco Use   • Smoking status: Former Smoker     Start date:      Last attempt to quit:      Years since quittin.1   • Smokeless tobacco: Never Used   • Tobacco comment: smokes 1 to 2 packs per day for 10 years   Substance and Sexual Activity   • Alcohol use: Yes     Alcohol/week: 1.0 standard drinks     Types: 1 Cans of beer per week     Comment: drinks 7 or less drinks per week   • Drug use: No   • Sexual activity: Not Currently     Partners: Female       Current Outpatient Medications on File Prior to Visit   Medication Sig Dispense Refill   • hydroCHLOROthiazide (HYDRODIURIL) 25 MG tablet Take 1 tablet by mouth Daily. 90 tablet 11   • rosuvastatin (CRESTOR) 5 MG tablet Take 1 tablet by mouth every night at bedtime. 90 tablet 6   • traMADol (ULTRAM) 50 MG tablet Take 50 mg by mouth Every 6 (Six) Hours As Needed for Moderate Pain .       No current facility-administered medications on file prior to visit.        Review of Systems   Constitutional: Negative for fatigue and  unexpected weight gain.   Eyes: Negative for blurred vision.   Respiratory: Negative for cough, chest tightness and shortness of breath.    Cardiovascular: Negative for chest pain, palpitations and leg swelling.   Gastrointestinal: Negative for nausea and vomiting.   Endocrine: Negative for polydipsia and polyuria.   Genitourinary: Negative for frequency.   Skin: Negative for skin lesions.   Neurological: Negative for dizziness, light-headedness, numbness and headache.       Recent Results (from the past 4704 hour(s))   Comprehensive Metabolic Panel    Collection Time: 01/27/20  3:35 PM   Result Value Ref Range    Glucose 111 (H) 65 - 99 mg/dL    BUN 17 8 - 23 mg/dL    Creatinine 1.02 0.76 - 1.27 mg/dL    eGFR Non African Am 72 >60 mL/min/1.73    eGFR African Am 87 >60 mL/min/1.73    BUN/Creatinine Ratio 16.7 7.0 - 25.0    Sodium 140 136 - 145 mmol/L    Potassium 4.3 3.5 - 5.2 mmol/L    Chloride 95 (L) 98 - 107 mmol/L    Total CO2 30.4 (H) 22.0 - 29.0 mmol/L    Calcium 10.0 8.6 - 10.5 mg/dL    Total Protein 7.3 6.0 - 8.5 g/dL    Albumin 4.70 3.50 - 5.20 g/dL    Globulin 2.6 gm/dL    A/G Ratio 1.8 g/dL    Total Bilirubin 1.1 0.2 - 1.2 mg/dL    Alkaline Phosphatase 78 39 - 117 U/L    AST (SGOT) 18 1 - 40 U/L    ALT (SGPT) 17 1 - 41 U/L   Lipid Panel    Collection Time: 01/27/20  3:35 PM   Result Value Ref Range    Total Cholesterol 188 0 - 200 mg/dL    Triglycerides 244 (H) 0 - 150 mg/dL    HDL Cholesterol 44 40 - 60 mg/dL    VLDL Cholesterol 48.8 mg/dL    LDL Cholesterol  95 0 - 100 mg/dL   CBC & Differential    Collection Time: 01/27/20  3:35 PM   Result Value Ref Range    WBC 7.64 3.40 - 10.80 10*3/mm3    RBC 5.63 4.14 - 5.80 10*6/mm3    Hemoglobin 16.1 13.0 - 17.7 g/dL    Hematocrit 48.2 37.5 - 51.0 %    MCV 85.6 79.0 - 97.0 fL    MCH 28.6 26.6 - 33.0 pg    MCHC 33.4 31.5 - 35.7 g/dL    RDW 12.3 12.3 - 15.4 %    Platelets 262 140 - 450 10*3/mm3    Neutrophil Rel % 57.7 42.7 - 76.0 %    Lymphocyte Rel % 30.0 19.6 -  "45.3 %    Monocyte Rel % 8.4 5.0 - 12.0 %    Eosinophil Rel % 2.4 0.3 - 6.2 %    Basophil Rel % 1.0 0.0 - 1.5 %    Neutrophils Absolute 4.41 1.70 - 7.00 10*3/mm3    Lymphocytes Absolute 2.29 0.70 - 3.10 10*3/mm3    Monocytes Absolute 0.64 0.10 - 0.90 10*3/mm3    Eosinophils Absolute 0.18 0.00 - 0.40 10*3/mm3    Basophils Absolute 0.08 0.00 - 0.20 10*3/mm3    Immature Granulocyte Rel % 0.5 0.0 - 0.5 %    Immature Grans Absolute 0.04 0.00 - 0.05 10*3/mm3    nRBC 0.0 0.0 - 0.2 /100 WBC   Hemoglobin A1c    Collection Time: 01/27/20  3:35 PM   Result Value Ref Range    Hemoglobin A1C 7.40 (H) 4.80 - 5.60 %     Objective   Vitals:    01/28/20 1403   BP: 128/82   Pulse: 69   Temp: 97.5 °F (36.4 °C)   SpO2: 93%   Weight: 114 kg (250 lb 8 oz)   Height: 175.3 cm (69.02\")     Body mass index is 36.98 kg/m².  Physical Exam   Constitutional: He appears well-developed and well-nourished. No distress.   Cardiovascular: Normal rate and regular rhythm.   Pulmonary/Chest: Effort normal and breath sounds normal. No respiratory distress. He has no wheezes.   Skin: He is not diaphoretic.   Nursing note and vitals reviewed.        Assessment/Plan   Summer was seen today for diabetes.    Diagnoses and all orders for this visit:    Type 2 diabetes mellitus without complication, without long-term current use of insulin (CMS/[facility])  -     metFORMIN (GLUCOPHAGE) 1000 MG tablet; Take 1 tablet by mouth 2 (Two) Times a Day With Meals.      Detailed diet given.  Return in about 4 weeks (around 2/25/2020) for DIABETES.  Med education given.         "

## 2020-02-27 ENCOUNTER — OFFICE VISIT (OUTPATIENT)
Dept: FAMILY MEDICINE CLINIC | Facility: CLINIC | Age: 75
End: 2020-02-27

## 2020-02-27 VITALS
TEMPERATURE: 97.6 F | WEIGHT: 244 LBS | HEIGHT: 69 IN | SYSTOLIC BLOOD PRESSURE: 124 MMHG | BODY MASS INDEX: 36.14 KG/M2 | HEART RATE: 59 BPM | DIASTOLIC BLOOD PRESSURE: 76 MMHG | OXYGEN SATURATION: 98 %

## 2020-02-27 DIAGNOSIS — E11.9 TYPE 2 DIABETES MELLITUS WITHOUT COMPLICATION, WITHOUT LONG-TERM CURRENT USE OF INSULIN (HCC): ICD-10-CM

## 2020-02-27 DIAGNOSIS — E78.5 HYPERLIPIDEMIA, UNSPECIFIED HYPERLIPIDEMIA TYPE: ICD-10-CM

## 2020-02-27 DIAGNOSIS — I10 ESSENTIAL HYPERTENSION: ICD-10-CM

## 2020-02-27 DIAGNOSIS — Z00.00 MEDICARE ANNUAL WELLNESS VISIT, SUBSEQUENT: Primary | ICD-10-CM

## 2020-02-27 PROCEDURE — G0439 PPPS, SUBSEQ VISIT: HCPCS | Performed by: FAMILY MEDICINE

## 2020-02-27 NOTE — PROGRESS NOTES
The ABCs of the Annual Wellness Visit  Subsequent Medicare Wellness Visit    Chief Complaint   Patient presents with   • Diabetes       Subjective   History of Present Illness:  Summer Condon Jr. is a 74 y.o. male who presents for a Subsequent Medicare Wellness Visit.    HEALTH RISK ASSESSMENT    Recent Hospitalizations:  No hospitalization(s) within the last year.    Current Medical Providers:  Patient Care Team:  Vicki Leon MD as PCP - General (Family Medicine)    Smoking Status:  Social History     Tobacco Use   Smoking Status Former Smoker   • Start date:    • Last attempt to quit:    • Years since quittin.1   Smokeless Tobacco Never Used   Tobacco Comment    smokes 1 to 2 packs per day for 10 years       Alcohol Consumption:  Social History     Substance and Sexual Activity   Alcohol Use Yes   • Alcohol/week: 1.0 standard drinks   • Types: 1 Cans of beer per week    Comment: drinks 7 or less drinks per week       Depression Screen:   PHQ-2/PHQ-9 Depression Screening 2020   Little interest or pleasure in doing things 0   Feeling down, depressed, or hopeless 0   Total Score 0       Fall Risk Screen:  YENNIFER Fall Risk Assessment was completed, and patient is at LOW risk for falls.Assessment completed on:2020    Health Habits and Functional and Cognitive Screening:  Functional & Cognitive Status 2020   Do you have difficulty preparing food and eating? No   Do you have difficulty bathing yourself, getting dressed or grooming yourself? No   Do you have difficulty using the toilet? No   Do you have difficulty moving around from place to place? No   Do you have trouble with steps or getting out of a bed or a chair? No   Current Diet Well Balanced Diet   Dental Exam Up to date   Eye Exam Up to date   Exercise (times per week) 0 times per week   Current Exercise Activities Include None   Do you need help using the phone?  No   Are you deaf or do you have serious difficulty hearing?   Yes   Do you need help with transportation? No   Do you need help shopping? No   Do you need help preparing meals?  No   Do you need help with housework?  No   Do you need help with laundry? No   Do you need help taking your medications? No   Do you need help managing money? No   Do you ever drive or ride in a car without wearing a seat belt? No   Have you felt unusual stress, anger or loneliness in the last month? No   Who do you live with? Spouse   If you need help, do you have trouble finding someone available to you? No   Have you been bothered in the last four weeks by sexual problems? No         Does the patient have evidence of cognitive impairment? No    Asprin use counseling:Does not need ASA (and currently is not on it)    Age-appropriate Screening Schedule:  Refer to the list below for future screening recommendations based on patient's age, sex and/or medical conditions. Orders for these recommended tests are listed in the plan section. The patient has been provided with a written plan.    Health Maintenance   Topic Date Due   • URINE MICROALBUMIN  1945   • TDAP/TD VACCINES (1 - Tdap) 11/02/1958   • ZOSTER VACCINE (1 of 2) 11/02/1997   • DIABETIC FOOT EXAM  10/10/2017   • DIABETIC EYE EXAM  10/10/2017   • HEMOGLOBIN A1C  07/27/2020   • LIPID PANEL  01/27/2021   • COLONOSCOPY  10/26/2028   • INFLUENZA VACCINE  Completed          The following portions of the patient's history were reviewed and updated as appropriate: allergies, current medications, past family history, past medical history, past social history, past surgical history and problem list.    Outpatient Medications Prior to Visit   Medication Sig Dispense Refill   • hydroCHLOROthiazide (HYDRODIURIL) 25 MG tablet Take 1 tablet by mouth Daily. 90 tablet 11   • metFORMIN (GLUCOPHAGE) 1000 MG tablet Take 1 tablet by mouth 2 (Two) Times a Day With Meals. (Patient taking differently: Take 1,000 mg by mouth 2 (Two) Times a Day With Meals. 1/2  "tablet in the morning and 1/2 tablet at night.) 60 tablet 11   • rosuvastatin (CRESTOR) 5 MG tablet Take 1 tablet by mouth every night at bedtime. 90 tablet 6   • traMADol (ULTRAM) 50 MG tablet Take 50 mg by mouth Every 6 (Six) Hours As Needed for Moderate Pain .       No facility-administered medications prior to visit.        Patient Active Problem List   Diagnosis   • Right upper quadrant abdominal pain   • Hemorrhage of anus and rectum   • GI bleed   • Morbidly obese (CMS/[facility])   • Hypertension   • Hyperlipidemia   • Medicare annual wellness visit, subsequent   • Type 2 diabetes mellitus without complication, without long-term current use of insulin (CMS/[facility])       Advanced Care Planning:  ACP discussion was held with the patient during this visit.    Review of Systems   Constitutional: Negative.        Compared to one year ago, the patient feels his physical health is the same.  Compared to one year ago, the patient feels his mental health is the same.    Reviewed chart for potential of high risk medication in the elderly: yes  Reviewed chart for potential of harmful drug interactions in the elderly:yes    Objective         Vitals:    02/27/20 0850   BP: 124/76   Pulse: 59   Temp: 97.6 °F (36.4 °C)   SpO2: 98%   Weight: 111 kg (244 lb)   Height: 175.3 cm (69.02\")       Body mass index is 36.02 kg/m².  Discussed the patient's BMI with him. The BMI is above average; BMI management plan is completed.    Physical Exam   Constitutional: He appears well-developed and well-nourished. No distress.   Skin: He is not diaphoretic.   Nursing note and vitals reviewed.      Lab Results   Component Value Date     (H) 01/27/2020    CHLPL 188 01/27/2020    TRIG 244 (H) 01/27/2020    HDL 44 01/27/2020    LDL 95 01/27/2020    VLDL 48.8 01/27/2020    HGBA1C 7.40 (H) 01/27/2020        Assessment/Plan   Medicare Risks and Personalized Health Plan  CMS Preventative Services Quick Reference  Fall Risk    The above risks/problems " have been discussed with the patient.  Pertinent information has been shared with the patient in the After Visit Summary.  Follow up plans and orders are seen below in the Assessment/Plan Section.    Diagnoses and all orders for this visit:    1. Medicare annual wellness visit, subsequent (Primary)    2. Type 2 diabetes mellitus without complication, without long-term current use of insulin (CMS/[facility])    3. Essential hypertension    4. Hyperlipidemia, unspecified hyperlipidemia type    Continue current meds for above  Follow Up:  Return in about 3 months (around 5/27/2020) for DIABETES.     An After Visit Summary and PPPS were given to the patient.

## 2020-02-27 NOTE — PATIENT INSTRUCTIONS
Medicare Wellness  Personal Prevention Plan of Service     Date of Office Visit:  2020  Encounter Provider:  Vicki Leon MD  Place of Service:  [facility]  Patient Name: Summer Condon Jr.  :  1945    As part of the Medicare Wellness portion of your visit today, we are providing you with this personalized preventive plan of services (PPPS). This plan is based upon recommendations of the [place] Preventive Services Task Force (USPSTF) and the Advisory Committee on Immunization Practices (ACIP).    This lists the preventive care services that should be considered, and provides dates of when you are due. Items listed as completed are up-to-date and do not require any further intervention.    Health Maintenance   Topic Date Due   • URINE MICROALBUMIN  1945   • TDAP/TD VACCINES (1 - Tdap) 1958   • ZOSTER VACCINE (1 of 2) 1997   • DIABETIC FOOT EXAM  10/10/2017   • DIABETIC EYE EXAM  10/10/2017   • HEMOGLOBIN A1C  2020   • LIPID PANEL  2021   • MEDICARE ANNUAL WELLNESS  2021   • COLONOSCOPY  10/26/2028   • Pneumococcal Vaccine Once at 65 Years Old  Completed   • INFLUENZA VACCINE  Completed   • HEPATITIS C SCREENING  Discontinued   • AAA SCREEN (ONE-TIME)  Discontinued       No orders of the defined types were placed in this encounter.      Return in about 3 months (around 2020) for DIABETES.

## 2020-05-13 RX ORDER — FEXOFENADINE HCL 180 MG/1
180 TABLET ORAL DAILY
COMMUNITY
End: 2021-04-02

## 2020-05-14 ENCOUNTER — OFFICE VISIT (OUTPATIENT)
Dept: FAMILY MEDICINE CLINIC | Facility: CLINIC | Age: 75
End: 2020-05-14

## 2020-05-14 VITALS
DIASTOLIC BLOOD PRESSURE: 84 MMHG | HEIGHT: 69 IN | WEIGHT: 244 LBS | SYSTOLIC BLOOD PRESSURE: 124 MMHG | TEMPERATURE: 97.3 F | OXYGEN SATURATION: 98 % | HEART RATE: 76 BPM | BODY MASS INDEX: 36.14 KG/M2

## 2020-05-14 DIAGNOSIS — E11.9 TYPE 2 DIABETES MELLITUS WITHOUT COMPLICATION, WITHOUT LONG-TERM CURRENT USE OF INSULIN (HCC): Primary | ICD-10-CM

## 2020-05-14 DIAGNOSIS — E78.5 HYPERLIPIDEMIA, UNSPECIFIED HYPERLIPIDEMIA TYPE: ICD-10-CM

## 2020-05-14 DIAGNOSIS — Z23 IMMUNIZATION DUE: ICD-10-CM

## 2020-05-14 DIAGNOSIS — I10 ESSENTIAL HYPERTENSION: ICD-10-CM

## 2020-05-14 DIAGNOSIS — Z00.00 HEALTHCARE MAINTENANCE: ICD-10-CM

## 2020-05-14 PROCEDURE — 99214 OFFICE O/P EST MOD 30 MIN: CPT | Performed by: FAMILY MEDICINE

## 2020-05-14 RX ORDER — BLOOD SUGAR DIAGNOSTIC
1 STRIP MISCELLANEOUS DAILY
COMMUNITY
Start: 2020-04-24 | End: 2021-04-02 | Stop reason: SDUPTHER

## 2020-05-14 RX ORDER — ROSUVASTATIN CALCIUM 5 MG/1
5 TABLET, COATED ORAL
Qty: 90 TABLET | Refills: 6 | Status: SHIPPED | OUTPATIENT
Start: 2020-05-14 | End: 2021-04-02 | Stop reason: SDUPTHER

## 2020-05-14 RX ORDER — LISINOPRIL 10 MG/1
10 TABLET ORAL DAILY
Qty: 90 TABLET | Refills: 1 | Status: SHIPPED | OUTPATIENT
Start: 2020-05-14 | End: 2020-11-16

## 2020-05-14 NOTE — PROGRESS NOTES
Subjective   Summer Condon Jr. is a 74 y.o. male.     Chief Complaint   Patient presents with   • Diabetes       Diabetes   He presents for his follow-up diabetic visit. He has type 2 diabetes mellitus. His disease course has been stable. There are no hypoglycemic associated symptoms. Pertinent negatives for hypoglycemia include no dizziness. There are no diabetic associated symptoms. Pertinent negatives for diabetes include no blurred vision, no chest pain and no fatigue. Symptoms are stable.   Hypertension   This is a chronic problem. The current episode started more than 1 year ago. The problem is unchanged. The problem is controlled. Pertinent negatives include no blurred vision, chest pain, palpitations or shortness of breath.   Hyperlipidemia   This is a chronic problem. The current episode started more than 1 year ago. The problem is controlled. Recent lipid tests were reviewed and are normal. Pertinent negatives include no chest pain or shortness of breath. Current antihyperlipidemic treatment includes statins. The current treatment provides significant improvement of lipids.          The following portions of the patient's history were reviewed and updated as appropriate: allergies, current medications, past family history, past medical history, past social history, past surgical history and problem list.    Past Medical History:   Diagnosis Date   • Abdominal pain of multiple sites    • Abdominal pain, RUQ (right upper quadrant)    • Acute sinusitis    • Allergic rhinitis    • Arthritis    • Benign essential hypertension    • BMI 35.0-35.9,adult    • Cholelithiasis    • Condyloma acuminata    • Cough    • Elevated cholesterol    • Encounter for postoperative wound check    • Flu-like symptoms    • Former smoker     smokes 1 to 2 packs per day for 10 years   • GERD (gastroesophageal reflux disease)    • GI bleed    • Hemorrhoids    • High blood pressure    • High cholesterol    • History of allergy    •  History of cataract    • History of colon polyps    • History of long-term treatment with high-risk medication    • Hyperglycemia    • Hyperlipidemia    • Hypertension    • Influenza A    • Internal hemorrhoids with complication    • Morbidly obese (CMS/HCC)    • Myalgia    • Myositis    • Nasal congestion    • No post-op complications    • Prediabetes    • Pruritus ani    • Skin lesion    • Sore throat        Past Surgical History:   Procedure Laterality Date   • CATARACT EXTRACTION     • CHOLECYSTECTOMY     • COLONOSCOPY  2014    Reid Yousif MD   • COLONOSCOPY N/A 10/26/2018    Procedure: COLONOSCOPY TO CECUM AND INTO TERMINAL ILEUM;  Surgeon: Reid Yousif MD;  Location: [facility] ENDOSCOPY;  Service: Gastroenterology   • ELBOW ARTHROPLASTY         Family History   Problem Relation Age of Onset   • Arthritis Father    • No Known Problems Other        Social History     Socioeconomic History   • Marital status:      Spouse name: Not on file   • Number of children: Not on file   • Years of education: Not on file   • Highest education level: Not on file   Tobacco Use   • Smoking status: Former Smoker     Start date:      Last attempt to quit:      Years since quittin.3   • Smokeless tobacco: Never Used   • Tobacco comment: smokes 1 to 2 packs per day for 10 years   Substance and Sexual Activity   • Alcohol use: Yes     Alcohol/week: 1.0 standard drinks     Types: 1 Cans of beer per week     Comment: drinks 7 or less drinks per week   • Drug use: No   • Sexual activity: Not Currently     Partners: Female       Current Outpatient Medications on File Prior to Visit   Medication Sig Dispense Refill   • fexofenadine (ALLEGRA) 180 MG tablet Take 180 mg by mouth Daily.     • traMADol (ULTRAM) 50 MG tablet Take 50 mg by mouth Every 6 (Six) Hours As Needed for Moderate Pain .     • [DISCONTINUED] hydroCHLOROthiazide (HYDRODIURIL) 25 MG tablet Take 1 tablet by mouth Daily. 90 tablet 11   •  [DISCONTINUED] metFORMIN (GLUCOPHAGE) 1000 MG tablet Take 1 tablet by mouth 2 (Two) Times a Day With Meals. (Patient taking differently: Take 1,000 mg by mouth 2 (Two) Times a Day With Meals. 1/2 tablet in the morning and 1/2 tablet at night.) 60 tablet 11   • [DISCONTINUED] rosuvastatin (CRESTOR) 5 MG tablet Take 1 tablet by mouth every night at bedtime. 90 tablet 6   • ACCU-CHEK GUIDE test strip 1 each by Other route Daily.       No current facility-administered medications on file prior to visit.        Review of Systems   Constitutional: Negative for fatigue.   Eyes: Negative for blurred vision.   Respiratory: Negative for cough, chest tightness and shortness of breath.    Cardiovascular: Negative for chest pain, palpitations and leg swelling.   Neurological: Negative for dizziness, light-headedness and headache.       Recent Results (from the past 4704 hour(s))   Comprehensive Metabolic Panel    Collection Time: 01/27/20  3:35 PM   Result Value Ref Range    Glucose 111 (H) 65 - 99 mg/dL    BUN 17 8 - 23 mg/dL    Creatinine 1.02 0.76 - 1.27 mg/dL    eGFR Non African Am 72 >60 mL/min/1.73    eGFR African Am 87 >60 mL/min/1.73    BUN/Creatinine Ratio 16.7 7.0 - 25.0    Sodium 140 136 - 145 mmol/L    Potassium 4.3 3.5 - 5.2 mmol/L    Chloride 95 (L) 98 - 107 mmol/L    Total CO2 30.4 (H) 22.0 - 29.0 mmol/L    Calcium 10.0 8.6 - 10.5 mg/dL    Total Protein 7.3 6.0 - 8.5 g/dL    Albumin 4.70 3.50 - 5.20 g/dL    Globulin 2.6 gm/dL    A/G Ratio 1.8 g/dL    Total Bilirubin 1.1 0.2 - 1.2 mg/dL    Alkaline Phosphatase 78 39 - 117 U/L    AST (SGOT) 18 1 - 40 U/L    ALT (SGPT) 17 1 - 41 U/L   Lipid Panel    Collection Time: 01/27/20  3:35 PM   Result Value Ref Range    Total Cholesterol 188 0 - 200 mg/dL    Triglycerides 244 (H) 0 - 150 mg/dL    HDL Cholesterol 44 40 - 60 mg/dL    VLDL Cholesterol 48.8 mg/dL    LDL Cholesterol  95 0 - 100 mg/dL   CBC & Differential    Collection Time: 01/27/20  3:35 PM   Result Value Ref  "Range    WBC 7.64 3.40 - 10.80 10*3/mm3    RBC 5.63 4.14 - 5.80 10*6/mm3    Hemoglobin 16.1 13.0 - 17.7 g/dL    Hematocrit 48.2 37.5 - 51.0 %    MCV 85.6 79.0 - 97.0 fL    MCH 28.6 26.6 - 33.0 pg    MCHC 33.4 31.5 - 35.7 g/dL    RDW 12.3 12.3 - 15.4 %    Platelets 262 140 - 450 10*3/mm3    Neutrophil Rel % 57.7 42.7 - 76.0 %    Lymphocyte Rel % 30.0 19.6 - 45.3 %    Monocyte Rel % 8.4 5.0 - 12.0 %    Eosinophil Rel % 2.4 0.3 - 6.2 %    Basophil Rel % 1.0 0.0 - 1.5 %    Neutrophils Absolute 4.41 1.70 - 7.00 10*3/mm3    Lymphocytes Absolute 2.29 0.70 - 3.10 10*3/mm3    Monocytes Absolute 0.64 0.10 - 0.90 10*3/mm3    Eosinophils Absolute 0.18 0.00 - 0.40 10*3/mm3    Basophils Absolute 0.08 0.00 - 0.20 10*3/mm3    Immature Granulocyte Rel % 0.5 0.0 - 0.5 %    Immature Grans Absolute 0.04 0.00 - 0.05 10*3/mm3    nRBC 0.0 0.0 - 0.2 /100 WBC   Hemoglobin A1c    Collection Time: 01/27/20  3:35 PM   Result Value Ref Range    Hemoglobin A1C 7.40 (H) 4.80 - 5.60 %     Objective   Vitals:    05/14/20 0853   BP: 124/84   BP Location: Left arm   Patient Position: Sitting   Cuff Size: Large Adult   Pulse: 76   Temp: 97.3 °F (36.3 °C)   TempSrc: Temporal   SpO2: 98%   Weight: 111 kg (244 lb)   Height: 175.3 cm (69.02\")     Body mass index is 36.02 kg/m².  Physical Exam   Constitutional: He appears well-developed and well-nourished. No distress.   Cardiovascular: Normal rate and regular rhythm.   Pulmonary/Chest: Effort normal and breath sounds normal. No respiratory distress. He has no wheezes.   Skin: He is not diaphoretic.   Nursing note and vitals reviewed.        Assessment/Plan   Summer was seen today for diabetes.    Diagnoses and all orders for this visit:    Type 2 diabetes mellitus without complication, without long-term current use of insulin (CMS/[facility])  -     Hemoglobin A1c  -     Comprehensive Metabolic Panel  -     Lipid Panel With LDL / HDL Ratio  -     Microalbumin / Creatinine Urine Ratio - Urine, Clean Catch  -     " metFORMIN (Glucophage) 1000 MG tablet; Take 1 tablet by mouth 2 (Two) Times a Day With Meals.  -     rosuvastatin (CRESTOR) 5 MG tablet; Take 1 tablet by mouth every night at bedtime.  -     lisinopril (PRINIVIL,ZESTRIL) 10 MG tablet; Take 1 tablet by mouth Daily.  -     Urinalysis without microscopic (no culture) - Urine, Clean Catch; Future    Essential hypertension  -     lisinopril (PRINIVIL,ZESTRIL) 10 MG tablet; Take 1 tablet by mouth Daily.    Hyperlipidemia, unspecified hyperlipidemia type  -     rosuvastatin (CRESTOR) 5 MG tablet; Take 1 tablet by mouth every night at bedtime.      Return in about 3 months (around 8/14/2020), or if symptoms worsen or fail to improve.

## 2020-05-15 LAB
ALBUMIN SERPL-MCNC: 4.5 G/DL (ref 3.5–5.2)
ALBUMIN/CREAT UR: 4 MG/G CREAT (ref 0–29)
ALBUMIN/GLOB SERPL: 1.5 G/DL
ALP SERPL-CCNC: 62 U/L (ref 39–117)
ALT SERPL-CCNC: 22 U/L (ref 1–41)
AST SERPL-CCNC: 22 U/L (ref 1–40)
BILIRUB SERPL-MCNC: 1.4 MG/DL (ref 0.2–1.2)
BUN SERPL-MCNC: 14 MG/DL (ref 8–23)
BUN/CREAT SERPL: 15.2 (ref 7–25)
CALCIUM SERPL-MCNC: 9.6 MG/DL (ref 8.6–10.5)
CHLORIDE SERPL-SCNC: 98 MMOL/L (ref 98–107)
CHOLEST SERPL-MCNC: 153 MG/DL (ref 0–200)
CO2 SERPL-SCNC: 27.3 MMOL/L (ref 22–29)
CREAT SERPL-MCNC: 0.92 MG/DL (ref 0.76–1.27)
CREAT UR-MCNC: 109.3 MG/DL
GLOBULIN SER CALC-MCNC: 3 GM/DL
GLUCOSE SERPL-MCNC: 129 MG/DL (ref 65–99)
HBA1C MFR BLD: 6.2 % (ref 4.8–5.6)
HDLC SERPL-MCNC: 42 MG/DL (ref 40–60)
LDLC SERPL CALC-MCNC: 92 MG/DL (ref 0–100)
LDLC/HDLC SERPL: 2.2 {RATIO}
MICROALBUMIN UR-MCNC: 4 UG/ML
POTASSIUM SERPL-SCNC: 4 MMOL/L (ref 3.5–5.2)
PROT SERPL-MCNC: 7.5 G/DL (ref 6–8.5)
SODIUM SERPL-SCNC: 139 MMOL/L (ref 136–145)
TRIGL SERPL-MCNC: 94 MG/DL (ref 0–150)
VLDLC SERPL CALC-MCNC: 18.8 MG/DL

## 2020-05-28 ENCOUNTER — TELEPHONE (OUTPATIENT)
Dept: FAMILY MEDICINE CLINIC | Facility: CLINIC | Age: 75
End: 2020-05-28

## 2020-11-15 DIAGNOSIS — I10 ESSENTIAL HYPERTENSION: ICD-10-CM

## 2020-11-15 DIAGNOSIS — E11.9 TYPE 2 DIABETES MELLITUS WITHOUT COMPLICATION, WITHOUT LONG-TERM CURRENT USE OF INSULIN (HCC): ICD-10-CM

## 2020-11-16 RX ORDER — LISINOPRIL 10 MG/1
TABLET ORAL
Qty: 90 TABLET | Refills: 0 | Status: SHIPPED | OUTPATIENT
Start: 2020-11-16 | End: 2021-02-10

## 2021-02-09 DIAGNOSIS — E11.9 TYPE 2 DIABETES MELLITUS WITHOUT COMPLICATION, WITHOUT LONG-TERM CURRENT USE OF INSULIN (HCC): ICD-10-CM

## 2021-02-09 DIAGNOSIS — I10 ESSENTIAL HYPERTENSION: ICD-10-CM

## 2021-02-10 RX ORDER — LISINOPRIL 10 MG/1
TABLET ORAL
Qty: 90 TABLET | Refills: 0 | Status: SHIPPED | OUTPATIENT
Start: 2021-02-10 | End: 2021-04-02 | Stop reason: SDUPTHER

## 2021-04-02 ENCOUNTER — OFFICE VISIT (OUTPATIENT)
Dept: FAMILY MEDICINE CLINIC | Facility: CLINIC | Age: 76
End: 2021-04-02

## 2021-04-02 VITALS
OXYGEN SATURATION: 96 % | HEART RATE: 65 BPM | BODY MASS INDEX: 34.8 KG/M2 | HEIGHT: 69 IN | WEIGHT: 235 LBS | SYSTOLIC BLOOD PRESSURE: 149 MMHG | TEMPERATURE: 98.3 F | DIASTOLIC BLOOD PRESSURE: 88 MMHG

## 2021-04-02 DIAGNOSIS — I10 ESSENTIAL HYPERTENSION: ICD-10-CM

## 2021-04-02 DIAGNOSIS — E11.9 TYPE 2 DIABETES MELLITUS WITHOUT COMPLICATION, WITHOUT LONG-TERM CURRENT USE OF INSULIN (HCC): ICD-10-CM

## 2021-04-02 DIAGNOSIS — Z00.00 MEDICARE ANNUAL WELLNESS VISIT, SUBSEQUENT: Primary | ICD-10-CM

## 2021-04-02 DIAGNOSIS — E78.2 MIXED HYPERLIPIDEMIA: ICD-10-CM

## 2021-04-02 PROCEDURE — G0439 PPPS, SUBSEQ VISIT: HCPCS | Performed by: FAMILY MEDICINE

## 2021-04-02 PROCEDURE — 99214 OFFICE O/P EST MOD 30 MIN: CPT | Performed by: FAMILY MEDICINE

## 2021-04-02 RX ORDER — LISINOPRIL 10 MG/1
10 TABLET ORAL DAILY
Qty: 90 TABLET | Refills: 3 | Status: SHIPPED | OUTPATIENT
Start: 2021-04-02 | End: 2021-08-02 | Stop reason: SDUPTHER

## 2021-04-02 RX ORDER — BLOOD SUGAR DIAGNOSTIC
1 STRIP MISCELLANEOUS DAILY
Qty: 100 EACH | Refills: 11 | Status: SHIPPED | OUTPATIENT
Start: 2021-04-02 | End: 2021-05-12 | Stop reason: SDUPTHER

## 2021-04-02 RX ORDER — ROSUVASTATIN CALCIUM 5 MG/1
5 TABLET, COATED ORAL
Qty: 90 TABLET | Refills: 6 | Status: SHIPPED | OUTPATIENT
Start: 2021-04-02 | End: 2021-08-02 | Stop reason: SDUPTHER

## 2021-04-02 NOTE — PROGRESS NOTES
The ABCs of the Annual Wellness Visit  Subsequent Medicare Wellness Visit    Chief Complaint   Patient presents with   • Medicare Wellness-subsequent       Subjective   History of Present Illness:  Summer Condon Jr. is a 75 y.o. male who presents for a Subsequent Medicare Wellness Visit.    HEALTH RISK ASSESSMENT    Recent Hospitalizations:  No hospitalization(s) within the last year.    Current Medical Providers:  Patient Care Team:  Vicki Leon MD as PCP - General (Family Medicine)    Smoking Status:  Social History     Tobacco Use   Smoking Status Former Smoker   • Start date:    • Quit date:    • Years since quittin.2   Smokeless Tobacco Never Used   Tobacco Comment    smokes 1 to 2 packs per day for 10 years       Alcohol Consumption:  Social History     Substance and Sexual Activity   Alcohol Use Yes   • Alcohol/week: 1.0 standard drinks   • Types: 1 Cans of beer per week    Comment: drinks 7 or less drinks per week       Depression Screen:   PHQ-2/PHQ-9 Depression Screening 2021   Little interest or pleasure in doing things 0   Feeling down, depressed, or hopeless 0   Trouble falling or staying asleep, or sleeping too much 0   Feeling tired or having little energy 0   Poor appetite or overeating 0   Feeling bad about yourself - or that you are a failure or have let yourself or your family down 0   Trouble concentrating on things, such as reading the newspaper or watching television 0   Moving or speaking so slowly that other people could have noticed. Or the opposite - being so fidgety or restless that you have been moving around a lot more than usual 0   Thoughts that you would be better off dead, or of hurting yourself in some way 0   Total Score 0   If you checked off any problems, how difficult have these problems made it for you to do your work, take care of things at home, or get along with other people? Not difficult at all       Fall Risk Screen:  STEADI Fall Risk Assessment  was completed, and patient is at LOW risk for falls.Assessment completed on:4/2/2021    Health Habits and Functional and Cognitive Screening:  Functional & Cognitive Status 4/2/2021   Do you have difficulty preparing food and eating? No   Do you have difficulty bathing yourself, getting dressed or grooming yourself? No   Do you have difficulty using the toilet? No   Do you have difficulty moving around from place to place? No   Do you have trouble with steps or getting out of a bed or a chair? No   Current Diet Diabetic Diet   Dental Exam Up to date   Eye Exam Not up to date   Exercise (times per week) 3 times per week   Current Exercises Include Walking   Current Exercise Activities Include Walking   Do you need help using the phone?  No   Are you deaf or do you have serious difficulty hearing?  Yes   Do you need help with transportation? No   Do you need help shopping? No   Do you need help preparing meals?  No   Do you need help with housework?  No   Do you need help with laundry? No   Do you need help taking your medications? No   Do you need help managing money? No   Do you ever drive or ride in a car without wearing a seat belt? No   Have you felt unusual stress, anger or loneliness in the last month? No   Who do you live with? Spouse   If you need help, do you have trouble finding someone available to you? No   Have you been bothered in the last four weeks by sexual problems? No   Do you have difficulty concentrating, remembering or making decisions? Yes         Does the patient have evidence of cognitive impairment? No    Asprin use counseling:Does not need ASA (and currently is not on it)    Age-appropriate Screening Schedule:  Refer to the list below for future screening recommendations based on patient's age, sex and/or medical conditions. Orders for these recommended tests are listed in the plan section. The patient has been provided with a written plan.    Health Maintenance   Topic Date Due   • TDAP/TD  VACCINES (1 - Tdap) Never done   • DIABETIC EYE EXAM  10/04/2020   • HEMOGLOBIN A1C  11/14/2020   • DIABETIC FOOT EXAM  02/28/2021   • LIPID PANEL  05/14/2021   • URINE MICROALBUMIN  05/14/2021   • INFLUENZA VACCINE  08/01/2021   • COLONOSCOPY  10/26/2028   • ZOSTER VACCINE  Completed          The following portions of the patient's history were reviewed and updated as appropriate: allergies, current medications, past family history, past medical history, past social history, past surgical history and problem list.    Outpatient Medications Prior to Visit   Medication Sig Dispense Refill   • ACCU-CHEK GUIDE test strip 1 each by Other route Daily.     • fexofenadine (ALLEGRA) 180 MG tablet Take 180 mg by mouth Daily.     • lisinopril (PRINIVIL,ZESTRIL) 10 MG tablet Take 1 tablet by mouth once daily 90 tablet 0   • metFORMIN (Glucophage) 1000 MG tablet Take 1 tablet by mouth 2 (Two) Times a Day With Meals. 60 tablet 11   • rosuvastatin (CRESTOR) 5 MG tablet Take 1 tablet by mouth every night at bedtime. 90 tablet 6   • traMADol (ULTRAM) 50 MG tablet Take 50 mg by mouth Every 6 (Six) Hours As Needed for Moderate Pain .       No facility-administered medications prior to visit.       Patient Active Problem List   Diagnosis   • Right upper quadrant abdominal pain   • Hemorrhage of anus and rectum   • GI bleed   • Morbidly obese (CMS/[facility])   • Hypertension   • Hyperlipidemia   • Medicare annual wellness visit, subsequent   • Type 2 diabetes mellitus without complication, without long-term current use of insulin (CMS/[facility])       Advanced Care Planning:  ACP discussion was held with the patient during this visit. Patient does not have an advance directive, information provided.    Review of Systems   Constitutional: Negative.        Compared to one year ago, the patient feels his physical health is the same.  Compared to one year ago, the patient feels his mental health is the same.    Reviewed chart for potential of high  "risk medication in the elderly: yes  Reviewed chart for potential of harmful drug interactions in the elderly:yes    Objective         Vitals:    04/02/21 1049   BP: 149/88   Pulse: 65   Temp: 98.3 °F (36.8 °C)   SpO2: 96%   Weight: 107 kg (235 lb)   Height: 175.3 cm (69.02\")       Body mass index is 34.69 kg/m².  Discussed the patient's BMI with him. The BMI is above average; BMI management plan is completed.    Physical Exam  Vitals and nursing note reviewed.   Constitutional:       General: He is not in acute distress.     Appearance: He is well-developed. He is not diaphoretic.   Cardiovascular:      Rate and Rhythm: Normal rate and regular rhythm.   Pulmonary:      Effort: Pulmonary effort is normal. No respiratory distress.      Breath sounds: Normal breath sounds. No wheezing.               Assessment/Plan   Medicare Risks and Personalized Health Plan  CMS Preventative Services Quick Reference  Fall Risk    The above risks/problems have been discussed with the patient.  Pertinent information has been shared with the patient in the After Visit Summary.  Follow up plans and orders are seen below in the Assessment/Plan Section.    Diagnoses and all orders for this visit:    1. Type 2 diabetes mellitus without complication, without long-term current use of insulin (CMS/[facility]) (Primary)  -     Hemoglobin A1c  -     Comprehensive Metabolic Panel  -     Lipid Panel With LDL / HDL Ratio  -     Microalbumin / Creatinine Urine Ratio - Urine, Clean Catch  -     lisinopril (PRINIVIL,ZESTRIL) 10 MG tablet; Take 1 tablet by mouth Daily.  Dispense: 90 tablet; Refill: 3  -     metFORMIN (Glucophage) 1000 MG tablet; Take 1 tablet by mouth 2 (Two) Times a Day With Meals.  Dispense: 60 tablet; Refill: 11  -     rosuvastatin (CRESTOR) 5 MG tablet; Take 1 tablet by mouth every night at bedtime.  Dispense: 90 tablet; Refill: 6    2. Essential hypertension  -     Comprehensive Metabolic Panel  -     Lipid Panel With LDL / HDL " Ratio  -     Microalbumin / Creatinine Urine Ratio - Urine, Clean Catch  -     lisinopril (PRINIVIL,ZESTRIL) 10 MG tablet; Take 1 tablet by mouth Daily.  Dispense: 90 tablet; Refill: 3    3. Mixed hyperlipidemia  -     Comprehensive Metabolic Panel  -     Lipid Panel With LDL / HDL Ratio  -     Microalbumin / Creatinine Urine Ratio - Urine, Clean Catch    4. Hyperlipidemia, unspecified hyperlipidemia type  -     rosuvastatin (CRESTOR) 5 MG tablet; Take 1 tablet by mouth every night at bedtime.  Dispense: 90 tablet; Refill: 6    Other orders  -     Accu-Chek Guide test strip; 1 each by Other route Daily.  Dispense: 100 each; Refill: 11      Follow Up:  Return in about 1 year (around 4/2/2022) for Medicare Wellness.   Return in about 4 months (around 8/2/2021) for HYPERTENSION, HYPERLIPIDEMIA, DIABETES.      An After Visit Summary and PPPS were given to the patient.

## 2021-04-02 NOTE — PROGRESS NOTES
Subjective   Summer Condon Jr. is a 75 y.o. male.     Chief Complaint   Patient presents with   • Medicare Wellness-subsequent       History of Present Illness   Diabetes follow-up stable.  Hypertension follow-up stable.  Hyperlipidemia follow-up stable.  She was lost to follow-up for almost a year    The following portions of the patient's history were reviewed and updated as appropriate: allergies, current medications, past family history, past medical history, past social history, past surgical history and problem list.    Past Medical History:   Diagnosis Date   • Abdominal pain of multiple sites    • Abdominal pain, RUQ (right upper quadrant)    • Acute sinusitis    • Allergic rhinitis    • Arthritis    • Benign essential hypertension    • BMI 35.0-35.9,adult    • Cholelithiasis    • Condyloma acuminata    • Cough    • Elevated cholesterol    • Encounter for postoperative wound check    • Flu-like symptoms    • Former smoker     smokes 1 to 2 packs per day for 10 years   • GERD (gastroesophageal reflux disease)    • GI bleed    • Hemorrhoids    • High blood pressure    • High cholesterol    • History of allergy    • History of cataract    • History of colon polyps    • History of long-term treatment with high-risk medication    • Hyperglycemia    • Hyperlipidemia    • Hypertension    • Influenza A    • Internal hemorrhoids with complication    • Morbidly obese (CMS/HCC)    • Myalgia    • Myositis    • Nasal congestion    • No post-op complications    • Prediabetes    • Pruritus ani    • Skin lesion    • Sore throat        Past Surgical History:   Procedure Laterality Date   • CATARACT EXTRACTION     • CHOLECYSTECTOMY     • COLONOSCOPY  11/17/2014    Reid Yousif MD   • COLONOSCOPY N/A 10/26/2018    Procedure: COLONOSCOPY TO CECUM AND INTO TERMINAL ILEUM;  Surgeon: Reid Yousif MD;  Location: [facility] ENDOSCOPY;  Service: Gastroenterology   • ELBOW ARTHROPLASTY         Family History   Problem Relation Age  "of Onset   • Arthritis Father    • No Known Problems Other        Social History     Socioeconomic History   • Marital status:      Spouse name: Not on file   • Number of children: Not on file   • Years of education: Not on file   • Highest education level: Not on file   Tobacco Use   • Smoking status: Former Smoker     Start date:      Quit date:      Years since quittin.2   • Smokeless tobacco: Never Used   • Tobacco comment: smokes 1 to 2 packs per day for 10 years   Substance and Sexual Activity   • Alcohol use: Yes     Alcohol/week: 1.0 standard drinks     Types: 1 Cans of beer per week     Comment: drinks 7 or less drinks per week   • Drug use: No   • Sexual activity: Not Currently     Partners: Female       Current Outpatient Medications on File Prior to Visit   Medication Sig Dispense Refill   • [DISCONTINUED] ACCU-CHEK GUIDE test strip 1 each by Other route Daily.     • [DISCONTINUED] fexofenadine (ALLEGRA) 180 MG tablet Take 180 mg by mouth Daily.     • [DISCONTINUED] lisinopril (PRINIVIL,ZESTRIL) 10 MG tablet Take 1 tablet by mouth once daily 90 tablet 0   • [DISCONTINUED] metFORMIN (Glucophage) 1000 MG tablet Take 1 tablet by mouth 2 (Two) Times a Day With Meals. 60 tablet 11   • [DISCONTINUED] rosuvastatin (CRESTOR) 5 MG tablet Take 1 tablet by mouth every night at bedtime. 90 tablet 6   • [DISCONTINUED] traMADol (ULTRAM) 50 MG tablet Take 50 mg by mouth Every 6 (Six) Hours As Needed for Moderate Pain .       No current facility-administered medications on file prior to visit.       Review of Systems    No results found for this or any previous visit (from the past 4704 hour(s)).  Objective   Vitals:    21 1049   BP: 149/88   Pulse: 65   Temp: 98.3 °F (36.8 °C)   SpO2: 96%   Weight: 107 kg (235 lb)   Height: 175.3 cm (69.02\")     Body mass index is 34.69 kg/m².  Physical Exam      Diagnoses and all orders for this visit:    1. Medicare annual wellness visit, subsequent " (Primary)    2. Type 2 diabetes mellitus without complication, without long-term current use of insulin (CMS/[facility])  Comments:  Stable.  Last A1c was 6.2.  Continue current medications.  Labs today  Orders:  -     Hemoglobin A1c  -     Comprehensive Metabolic Panel  -     Lipid Panel With LDL / HDL Ratio  -     Microalbumin / Creatinine Urine Ratio - Urine, Clean Catch  -     lisinopril (PRINIVIL,ZESTRIL) 10 MG tablet; Take 1 tablet by mouth Daily.  Dispense: 90 tablet; Refill: 3  -     metFORMIN (Glucophage) 1000 MG tablet; Take 1 tablet by mouth 2 (Two) Times a Day With Meals.  Dispense: 60 tablet; Refill: 11  -     rosuvastatin (CRESTOR) 5 MG tablet; Take 1 tablet by mouth every night at bedtime.  Dispense: 90 tablet; Refill: 6    3. Essential hypertension  Comments:    Controlled.  He was not taking his medications this morning.  Labs today.  Continue current medications  Orders:  -     Comprehensive Metabolic Panel  -     Lipid Panel With LDL / HDL Ratio  -     Microalbumin / Creatinine Urine Ratio - Urine, Clean Catch  -     lisinopril (PRINIVIL,ZESTRIL) 10 MG tablet; Take 1 tablet by mouth Daily.  Dispense: 90 tablet; Refill: 3    4. Mixed hyperlipidemia  Comments:  Continue current medications.  Labs today  Orders:  -     Comprehensive Metabolic Panel  -     Lipid Panel With LDL / HDL Ratio  -     Microalbumin / Creatinine Urine Ratio - Urine, Clean Catch    Other orders  -     Accu-Chek Guide test strip; 1 each by Other route Daily.  Dispense: 100 each; Refill: 11      Return in about 4 months (around 8/2/2021) for HYPERTENSION, HYPERLIPIDEMIA, DIABETES.

## 2021-04-03 LAB
ALBUMIN SERPL-MCNC: 4.8 G/DL (ref 3.5–5.2)
ALBUMIN/CREAT UR: 5 MG/G CREAT (ref 0–29)
ALBUMIN/GLOB SERPL: 1.8 G/DL
ALP SERPL-CCNC: 62 U/L (ref 39–117)
ALT SERPL-CCNC: 18 U/L (ref 1–41)
AST SERPL-CCNC: 18 U/L (ref 1–40)
BILIRUB SERPL-MCNC: 1.2 MG/DL (ref 0–1.2)
BUN SERPL-MCNC: 12 MG/DL (ref 8–23)
BUN/CREAT SERPL: 12.1 (ref 7–25)
CALCIUM SERPL-MCNC: 9.7 MG/DL (ref 8.6–10.5)
CHLORIDE SERPL-SCNC: 103 MMOL/L (ref 98–107)
CHOLEST SERPL-MCNC: 145 MG/DL (ref 0–200)
CO2 SERPL-SCNC: 29.3 MMOL/L (ref 22–29)
CREAT SERPL-MCNC: 0.99 MG/DL (ref 0.76–1.27)
CREAT UR-MCNC: 114 MG/DL
GLOBULIN SER CALC-MCNC: 2.6 GM/DL
GLUCOSE SERPL-MCNC: 107 MG/DL (ref 65–99)
HBA1C MFR BLD: 5.8 % (ref 4.8–5.6)
HDLC SERPL-MCNC: 52 MG/DL (ref 40–60)
LDLC SERPL CALC-MCNC: 72 MG/DL (ref 0–100)
LDLC/HDLC SERPL: 1.34 {RATIO}
MICROALBUMIN UR-MCNC: 5.5 UG/ML
POTASSIUM SERPL-SCNC: 5 MMOL/L (ref 3.5–5.2)
PROT SERPL-MCNC: 7.4 G/DL (ref 6–8.5)
SODIUM SERPL-SCNC: 142 MMOL/L (ref 136–145)
TRIGL SERPL-MCNC: 116 MG/DL (ref 0–150)
VLDLC SERPL CALC-MCNC: 21 MG/DL (ref 5–40)

## 2021-05-07 RX ORDER — BLOOD SUGAR DIAGNOSTIC
1 STRIP MISCELLANEOUS DAILY
Qty: 100 EACH | Refills: 11 | OUTPATIENT
Start: 2021-05-07

## 2021-05-12 RX ORDER — BLOOD SUGAR DIAGNOSTIC
STRIP MISCELLANEOUS
Qty: 100 EACH | Refills: 11 | Status: SHIPPED | OUTPATIENT
Start: 2021-05-12 | End: 2022-08-11 | Stop reason: SDUPTHER

## 2021-08-02 ENCOUNTER — OFFICE VISIT (OUTPATIENT)
Dept: FAMILY MEDICINE CLINIC | Facility: CLINIC | Age: 76
End: 2021-08-02

## 2021-08-02 VITALS
WEIGHT: 232 LBS | SYSTOLIC BLOOD PRESSURE: 133 MMHG | HEART RATE: 65 BPM | TEMPERATURE: 96.9 F | DIASTOLIC BLOOD PRESSURE: 83 MMHG | BODY MASS INDEX: 34.36 KG/M2 | HEIGHT: 69 IN | OXYGEN SATURATION: 95 %

## 2021-08-02 DIAGNOSIS — E78.2 MIXED HYPERLIPIDEMIA: ICD-10-CM

## 2021-08-02 DIAGNOSIS — I10 ESSENTIAL HYPERTENSION: ICD-10-CM

## 2021-08-02 DIAGNOSIS — I10 ESSENTIAL HYPERTENSION: Primary | ICD-10-CM

## 2021-08-02 DIAGNOSIS — E11.9 TYPE 2 DIABETES MELLITUS WITHOUT COMPLICATION, WITHOUT LONG-TERM CURRENT USE OF INSULIN (HCC): ICD-10-CM

## 2021-08-02 PROCEDURE — 99214 OFFICE O/P EST MOD 30 MIN: CPT | Performed by: FAMILY MEDICINE

## 2021-08-02 RX ORDER — LISINOPRIL 10 MG/1
10 TABLET ORAL DAILY
Qty: 90 TABLET | Refills: 3 | Status: SHIPPED | OUTPATIENT
Start: 2021-08-02 | End: 2021-12-06 | Stop reason: SDUPTHER

## 2021-08-02 RX ORDER — ROSUVASTATIN CALCIUM 5 MG/1
5 TABLET, COATED ORAL
Qty: 90 TABLET | Refills: 6 | Status: SHIPPED | OUTPATIENT
Start: 2021-08-02 | End: 2022-08-11 | Stop reason: SDUPTHER

## 2021-08-02 NOTE — PROGRESS NOTES
Subjective   Summer Condon Jr. is a 75 y.o. male.     Chief Complaint   Patient presents with   • Diabetes   • Follow-up       History of Present Illness   DM: stable,  Well controlled  HTN: stable, cont current  meds  Hyperlipidemia stable     The following portions of the patient's history were reviewed and updated as appropriate: allergies, current medications, past family history, past medical history, past social history, past surgical history and problem list.    Past Medical History:   Diagnosis Date   • Abdominal pain of multiple sites    • Abdominal pain, RUQ (right upper quadrant)    • Acute sinusitis    • Allergic rhinitis    • Arthritis    • Benign essential hypertension    • BMI 35.0-35.9,adult    • Cholelithiasis    • Condyloma acuminata    • Cough    • Elevated cholesterol    • Encounter for postoperative wound check    • Flu-like symptoms    • Former smoker     smokes 1 to 2 packs per day for 10 years   • GERD (gastroesophageal reflux disease)    • GI bleed    • Hemorrhoids    • High blood pressure    • High cholesterol    • History of allergy    • History of cataract    • History of colon polyps    • History of long-term treatment with high-risk medication    • Hyperglycemia    • Hyperlipidemia    • Hypertension    • Influenza A    • Internal hemorrhoids with complication    • Morbidly obese (CMS/HCC)    • Myalgia    • Myositis    • Nasal congestion    • No post-op complications    • Prediabetes    • Pruritus ani    • Skin lesion    • Sore throat        Past Surgical History:   Procedure Laterality Date   • CATARACT EXTRACTION     • CHOLECYSTECTOMY     • COLONOSCOPY  11/17/2014    Reid Yousif MD   • COLONOSCOPY N/A 10/26/2018    Procedure: COLONOSCOPY TO CECUM AND INTO TERMINAL ILEUM;  Surgeon: Reid Yousif MD;  Location: [facility] ENDOSCOPY;  Service: Gastroenterology   • ELBOW ARTHROPLASTY         Family History   Problem Relation Age of Onset   • Arthritis Father    • No Known Problems Other         Social History     Socioeconomic History   • Marital status:      Spouse name: Not on file   • Number of children: Not on file   • Years of education: Not on file   • Highest education level: Not on file   Tobacco Use   • Smoking status: Former Smoker     Start date:      Quit date:      Years since quittin.6   • Smokeless tobacco: Never Used   • Tobacco comment: smokes 1 to 2 packs per day for 10 years   Substance and Sexual Activity   • Alcohol use: Yes     Alcohol/week: 1.0 standard drinks     Types: 1 Cans of beer per week     Comment: drinks 7 or less drinks per week   • Drug use: No   • Sexual activity: Not Currently     Partners: Female       Current Outpatient Medications on File Prior to Visit   Medication Sig Dispense Refill   • Accu-Chek Guide test strip To test blood sugar daily.        DX: E11.9 100 each 11   • [DISCONTINUED] lisinopril (PRINIVIL,ZESTRIL) 10 MG tablet Take 1 tablet by mouth Daily. 90 tablet 3   • [DISCONTINUED] metFORMIN (Glucophage) 1000 MG tablet Take 1 tablet by mouth 2 (Two) Times a Day With Meals. 60 tablet 11   • [DISCONTINUED] rosuvastatin (CRESTOR) 5 MG tablet Take 1 tablet by mouth every night at bedtime. 90 tablet 6     No current facility-administered medications on file prior to visit.       Review of Systems   Constitutional: Negative.        Recent Results (from the past 4704 hour(s))   Hemoglobin A1c    Collection Time: 21 11:15 AM    Specimen: Blood   Result Value Ref Range    Hemoglobin A1C 5.80 (H) 4.80 - 5.60 %   Comprehensive Metabolic Panel    Collection Time: 21 11:15 AM    Specimen: Blood   Result Value Ref Range    Glucose 107 (H) 65 - 99 mg/dL    BUN 12 8 - 23 mg/dL    Creatinine 0.99 0.76 - 1.27 mg/dL    eGFR Non African Am 74 >60 mL/min/1.73    eGFR African Am 90 >60 mL/min/1.73    BUN/Creatinine Ratio 12.1 7.0 - 25.0    Sodium 142 136 - 145 mmol/L    Potassium 5.0 3.5 - 5.2 mmol/L    Chloride 103 98 - 107 mmol/L    Total  "CO2 29.3 (H) 22.0 - 29.0 mmol/L    Calcium 9.7 8.6 - 10.5 mg/dL    Total Protein 7.4 6.0 - 8.5 g/dL    Albumin 4.80 3.50 - 5.20 g/dL    Globulin 2.6 gm/dL    A/G Ratio 1.8 g/dL    Total Bilirubin 1.2 0.0 - 1.2 mg/dL    Alkaline Phosphatase 62 39 - 117 U/L    AST (SGOT) 18 1 - 40 U/L    ALT (SGPT) 18 1 - 41 U/L   Lipid Panel With LDL / HDL Ratio    Collection Time: 04/02/21 11:15 AM    Specimen: Blood   Result Value Ref Range    Total Cholesterol 145 0 - 200 mg/dL    Triglycerides 116 0 - 150 mg/dL    HDL Cholesterol 52 40 - 60 mg/dL    VLDL Cholesterol Ceferino 21 5 - 40 mg/dL    LDL Chol Calc (NIH) 72 0 - 100 mg/dL    LDL/HDL RATIO 1.34    Microalbumin / Creatinine Urine Ratio - Urine, Clean Catch    Collection Time: 04/02/21 11:15 AM    Specimen: Urine, Clean Catch   Result Value Ref Range    Creatinine, Urine 114.0 Not Estab. mg/dL    Microalbumin, Urine 5.5 Not Estab. ug/mL    Microalbumin/Creatinine Ratio 5 0 - 29 mg/g creat     Objective   Vitals:    08/02/21 1008   BP: 133/83   Pulse: 65   Temp: 96.9 °F (36.1 °C)   SpO2: 95%   Weight: 105 kg (232 lb)   Height: 175.3 cm (69.02\")     Body mass index is 34.24 kg/m².  Physical Exam  Vitals and nursing note reviewed.   Constitutional:       General: He is not in acute distress.     Appearance: He is well-developed. He is not diaphoretic.   Cardiovascular:      Rate and Rhythm: Normal rate and regular rhythm.   Pulmonary:      Effort: Pulmonary effort is normal. No respiratory distress.      Breath sounds: Normal breath sounds. No wheezing.           Diagnoses and all orders for this visit:    1. Essential hypertension (Primary)  Comments:  stable on meds  labs today  Orders:  -     Comprehensive Metabolic Panel  -     Lipid Panel With LDL / HDL Ratio  -     lisinopril (PRINIVIL,ZESTRIL) 10 MG tablet; Take 1 tablet by mouth Daily.  Dispense: 90 tablet; Refill: 3    2. Mixed hyperlipidemia  Comments:  stable  labs today  Orders:  -     Comprehensive Metabolic Panel  -    "  Lipid Panel With LDL / HDL Ratio    3. Type 2 diabetes mellitus without complication, without long-term current use of insulin (CMS/[facility])  Comments:  stable  labs today  Orders:  -     Hemoglobin A1c  -     Comprehensive Metabolic Panel  -     Lipid Panel With LDL / HDL Ratio  -     metFORMIN (Glucophage) 1000 MG tablet; Take 1/2 pill po bid  Dispense: 60 tablet; Refill: 11  -     lisinopril (PRINIVIL,ZESTRIL) 10 MG tablet; Take 1 tablet by mouth Daily.  Dispense: 90 tablet; Refill: 3  -     rosuvastatin (CRESTOR) 5 MG tablet; Take 1 tablet by mouth every night at bedtime.  Dispense: 90 tablet; Refill: 6    4. Type 2 diabetes mellitus without complication, without long-term current use of insulin (CMS/[facility])  Comments:  Stable.  Last A1c was 6.2.  Continue current medications.  Labs today  Orders:  -     Hemoglobin A1c  -     Comprehensive Metabolic Panel  -     Lipid Panel With LDL / HDL Ratio  -     metFORMIN (Glucophage) 1000 MG tablet; Take 1/2 pill po bid  Dispense: 60 tablet; Refill: 11  -     lisinopril (PRINIVIL,ZESTRIL) 10 MG tablet; Take 1 tablet by mouth Daily.  Dispense: 90 tablet; Refill: 3  -     rosuvastatin (CRESTOR) 5 MG tablet; Take 1 tablet by mouth every night at bedtime.  Dispense: 90 tablet; Refill: 6    5. Essential hypertension  Comments:    Controlled.  He was not taking his medications this morning.  Labs today.  Continue current medications  Orders:  -     Comprehensive Metabolic Panel  -     Lipid Panel With LDL / HDL Ratio  -     lisinopril (PRINIVIL,ZESTRIL) 10 MG tablet; Take 1 tablet by mouth Daily.  Dispense: 90 tablet; Refill: 3    Return in about 4 months (around 12/2/2021) for HYPERTENSION, DIABETES.

## 2021-08-03 LAB
ALBUMIN SERPL-MCNC: 4.7 G/DL (ref 3.5–5.2)
ALBUMIN/GLOB SERPL: 2 G/DL
ALP SERPL-CCNC: 56 U/L (ref 39–117)
ALT SERPL-CCNC: 17 U/L (ref 1–41)
AST SERPL-CCNC: 18 U/L (ref 1–40)
BILIRUB SERPL-MCNC: 1.2 MG/DL (ref 0–1.2)
BUN SERPL-MCNC: 15 MG/DL (ref 8–23)
BUN/CREAT SERPL: 15.2 (ref 7–25)
CALCIUM SERPL-MCNC: 9.3 MG/DL (ref 8.6–10.5)
CHLORIDE SERPL-SCNC: 104 MMOL/L (ref 98–107)
CHOLEST SERPL-MCNC: 157 MG/DL (ref 0–200)
CO2 SERPL-SCNC: 26.8 MMOL/L (ref 22–29)
CREAT SERPL-MCNC: 0.99 MG/DL (ref 0.76–1.27)
GLOBULIN SER CALC-MCNC: 2.4 GM/DL
GLUCOSE SERPL-MCNC: 106 MG/DL (ref 65–99)
HBA1C MFR BLD: 5.9 % (ref 4.8–5.6)
HDLC SERPL-MCNC: 47 MG/DL (ref 40–60)
LDLC SERPL CALC-MCNC: 86 MG/DL (ref 0–100)
LDLC/HDLC SERPL: 1.77 {RATIO}
POTASSIUM SERPL-SCNC: 4.3 MMOL/L (ref 3.5–5.2)
PROT SERPL-MCNC: 7.1 G/DL (ref 6–8.5)
SODIUM SERPL-SCNC: 141 MMOL/L (ref 136–145)
TRIGL SERPL-MCNC: 133 MG/DL (ref 0–150)
VLDLC SERPL CALC-MCNC: 24 MG/DL (ref 5–40)

## 2021-09-16 DIAGNOSIS — E11.9 TYPE 2 DIABETES MELLITUS WITHOUT COMPLICATION, WITHOUT LONG-TERM CURRENT USE OF INSULIN (HCC): ICD-10-CM

## 2021-12-06 ENCOUNTER — OFFICE VISIT (OUTPATIENT)
Dept: FAMILY MEDICINE CLINIC | Facility: CLINIC | Age: 76
End: 2021-12-06

## 2021-12-06 VITALS
DIASTOLIC BLOOD PRESSURE: 76 MMHG | SYSTOLIC BLOOD PRESSURE: 131 MMHG | OXYGEN SATURATION: 95 % | TEMPERATURE: 97.1 F | HEART RATE: 62 BPM | HEIGHT: 69 IN | BODY MASS INDEX: 33.74 KG/M2 | WEIGHT: 227.8 LBS

## 2021-12-06 DIAGNOSIS — R25.2 MUSCLE CRAMPS: ICD-10-CM

## 2021-12-06 DIAGNOSIS — I10 PRIMARY HYPERTENSION: Primary | ICD-10-CM

## 2021-12-06 DIAGNOSIS — E11.9 TYPE 2 DIABETES MELLITUS WITHOUT COMPLICATION, WITHOUT LONG-TERM CURRENT USE OF INSULIN (HCC): ICD-10-CM

## 2021-12-06 DIAGNOSIS — E78.2 MIXED HYPERLIPIDEMIA: ICD-10-CM

## 2021-12-06 DIAGNOSIS — I10 ESSENTIAL HYPERTENSION: ICD-10-CM

## 2021-12-06 PROCEDURE — 99214 OFFICE O/P EST MOD 30 MIN: CPT | Performed by: FAMILY MEDICINE

## 2021-12-06 RX ORDER — LISINOPRIL 10 MG/1
10 TABLET ORAL DAILY
Qty: 90 TABLET | Refills: 3 | Status: SHIPPED | OUTPATIENT
Start: 2021-12-06 | End: 2022-04-04

## 2021-12-06 RX ORDER — LANCETS 23 GAUGE
1 EACH MISCELLANEOUS DAILY
Qty: 100 EACH | Refills: 6 | Status: SHIPPED | OUTPATIENT
Start: 2021-12-06 | End: 2022-08-11 | Stop reason: SDUPTHER

## 2021-12-06 RX ORDER — BACLOFEN 10 MG/1
10 TABLET ORAL 3 TIMES DAILY
Qty: 30 TABLET | Refills: 11 | Status: SHIPPED | OUTPATIENT
Start: 2021-12-06 | End: 2022-12-30

## 2021-12-06 NOTE — PROGRESS NOTES
Subjective   Summer Condon Jr. is a 76 y.o. male.     Chief Complaint   Patient presents with   • Hypertension       History of Present Illness   HTN: stable on meds  DM stable on current dose  Hyperlipoidemia: stable on meds    The following portions of the patient's history were reviewed and updated as appropriate: allergies, current medications, past family history, past medical history, past social history, past surgical history and problem list.    Past Medical History:   Diagnosis Date   • Abdominal pain of multiple sites    • Abdominal pain, RUQ (right upper quadrant)    • Acute sinusitis    • Allergic rhinitis    • Arthritis    • Benign essential hypertension    • BMI 35.0-35.9,adult    • Cholelithiasis    • Condyloma acuminata    • Cough    • Elevated cholesterol    • Encounter for postoperative wound check    • Flu-like symptoms    • Former smoker     smokes 1 to 2 packs per day for 10 years   • GERD (gastroesophageal reflux disease)    • GI bleed    • Hemorrhoids    • High blood pressure    • High cholesterol    • History of allergy    • History of cataract    • History of colon polyps    • History of long-term treatment with high-risk medication    • Hyperglycemia    • Hyperlipidemia    • Hypertension    • Influenza A    • Internal hemorrhoids with complication    • Morbidly obese (HCC)    • Myalgia    • Myositis    • Nasal congestion    • No post-op complications    • Prediabetes    • Pruritus ani    • Skin lesion    • Sore throat        Past Surgical History:   Procedure Laterality Date   • CATARACT EXTRACTION     • CHOLECYSTECTOMY     • COLONOSCOPY  11/17/2014    Reid Yousif MD   • COLONOSCOPY N/A 10/26/2018    Procedure: COLONOSCOPY TO CECUM AND INTO TERMINAL ILEUM;  Surgeon: Reid Yousif MD;  Location: [facility] ENDOSCOPY;  Service: Gastroenterology   • ELBOW ARTHROPLASTY         Family History   Problem Relation Age of Onset   • Arthritis Father    • No Known Problems Other        Social  History     Socioeconomic History   • Marital status:    Tobacco Use   • Smoking status: Former Smoker     Start date:      Quit date: 1974     Years since quittin.9   • Smokeless tobacco: Never Used   • Tobacco comment: smokes 1 to 2 packs per day for 10 years   Substance and Sexual Activity   • Alcohol use: Yes     Alcohol/week: 1.0 standard drink     Types: 1 Cans of beer per week     Comment: drinks 7 or less drinks per week   • Drug use: No   • Sexual activity: Not Currently     Partners: Female       Current Outpatient Medications on File Prior to Visit   Medication Sig Dispense Refill   • Accu-Chek Guide test strip To test blood sugar daily.        DX: E11.9 100 each 11   • rosuvastatin (CRESTOR) 5 MG tablet Take 1 tablet by mouth every night at bedtime. 90 tablet 6   • [DISCONTINUED] lisinopril (PRINIVIL,ZESTRIL) 10 MG tablet Take 1 tablet by mouth Daily. 90 tablet 3   • [DISCONTINUED] metFORMIN (GLUCOPHAGE) 1000 MG tablet TAKE 1 TABLET BY MOUTH TWICE DAILY WITH MEALS 180 tablet 0     No current facility-administered medications on file prior to visit.       Review of Systems   Constitutional: Negative.        Recent Results (from the past 4704 hour(s))   Hemoglobin A1c    Collection Time: 21 10:59 AM    Specimen: Blood   Result Value Ref Range    Hemoglobin A1C 5.90 (H) 4.80 - 5.60 %   Comprehensive Metabolic Panel    Collection Time: 21 10:59 AM    Specimen: Blood   Result Value Ref Range    Glucose 106 (H) 65 - 99 mg/dL    BUN 15 8 - 23 mg/dL    Creatinine 0.99 0.76 - 1.27 mg/dL    eGFR Non African Am 74 >60 mL/min/1.73    eGFR African Am 90 >60 mL/min/1.73    BUN/Creatinine Ratio 15.2 7.0 - 25.0    Sodium 141 136 - 145 mmol/L    Potassium 4.3 3.5 - 5.2 mmol/L    Chloride 104 98 - 107 mmol/L    Total CO2 26.8 22.0 - 29.0 mmol/L    Calcium 9.3 8.6 - 10.5 mg/dL    Total Protein 7.1 6.0 - 8.5 g/dL    Albumin 4.70 3.50 - 5.20 g/dL    Globulin 2.4 gm/dL    A/G Ratio 2.0 g/dL     "Total Bilirubin 1.2 0.0 - 1.2 mg/dL    Alkaline Phosphatase 56 39 - 117 U/L    AST (SGOT) 18 1 - 40 U/L    ALT (SGPT) 17 1 - 41 U/L   Lipid Panel With LDL / HDL Ratio    Collection Time: 08/02/21 10:59 AM    Specimen: Blood   Result Value Ref Range    Total Cholesterol 157 0 - 200 mg/dL    Triglycerides 133 0 - 150 mg/dL    HDL Cholesterol 47 40 - 60 mg/dL    VLDL Cholesterol Ceferino 24 5 - 40 mg/dL    LDL Chol Calc (NIH) 86 0 - 100 mg/dL    LDL/HDL RATIO 1.77      Objective   Vitals:    12/06/21 1025   BP: 131/76   BP Location: Left arm   Patient Position: Sitting   Pulse: 62   Temp: 97.1 °F (36.2 °C)   SpO2: 95%   Weight: 103 kg (227 lb 12.8 oz)   Height: 175.3 cm (69.02\")     Body mass index is 33.62 kg/m².  Physical Exam  Vitals and nursing note reviewed.   Constitutional:       General: He is not in acute distress.     Appearance: He is well-developed. He is not diaphoretic.   Cardiovascular:      Rate and Rhythm: Normal rate and regular rhythm.   Pulmonary:      Effort: Pulmonary effort is normal. No respiratory distress.      Breath sounds: Normal breath sounds. No wheezing.           Diagnoses and all orders for this visit:    1. Primary hypertension (Primary)  -     Comprehensive Metabolic Panel  -     Lipid Panel With LDL / HDL Ratio    2. Mixed hyperlipidemia  -     Comprehensive Metabolic Panel  -     Lipid Panel With LDL / HDL Ratio    3. Type 2 diabetes mellitus without complication, without long-term current use of insulin (HCC)  -     Hemoglobin A1c  -     Comprehensive Metabolic Panel  -     Lipid Panel With LDL / HDL Ratio  -     lisinopril (PRINIVIL,ZESTRIL) 10 MG tablet; Take 1 tablet by mouth Daily.  Dispense: 90 tablet; Refill: 3  -     metFORMIN (GLUCOPHAGE) 1000 MG tablet; TAKE 1 TABLET BY MOUTH TWICE DAILY WITH MEALS  Dispense: 180 tablet; Refill: 3  -     Lancets 28G misc; 1 Units Daily.  Dispense: 100 each; Refill: 6    4. Type 2 diabetes mellitus without complication, without long-term " current use of insulin (HCC)  Comments:  Stable.  Last A1c was 6.2.  Continue current medications.  Labs today  Orders:  -     Hemoglobin A1c  -     Comprehensive Metabolic Panel  -     Lipid Panel With LDL / HDL Ratio  -     lisinopril (PRINIVIL,ZESTRIL) 10 MG tablet; Take 1 tablet by mouth Daily.  Dispense: 90 tablet; Refill: 3  -     metFORMIN (GLUCOPHAGE) 1000 MG tablet; TAKE 1 TABLET BY MOUTH TWICE DAILY WITH MEALS  Dispense: 180 tablet; Refill: 3  -     Lancets 28G misc; 1 Units Daily.  Dispense: 100 each; Refill: 6    5. Essential hypertension  Comments:    Controlled.  He was not taking his medications this morning.  Labs today.  Continue current medications  Orders:  -     lisinopril (PRINIVIL,ZESTRIL) 10 MG tablet; Take 1 tablet by mouth Daily.  Dispense: 90 tablet; Refill: 3    6. Muscle cramps    Return in about 17 weeks (around 4/4/2022) for Medicare Wellness.

## 2021-12-07 LAB
ALBUMIN SERPL-MCNC: 4.9 G/DL (ref 3.7–4.7)
ALBUMIN/GLOB SERPL: 1.8 {RATIO} (ref 1.2–2.2)
ALP SERPL-CCNC: 62 IU/L (ref 44–121)
ALT SERPL-CCNC: 18 IU/L (ref 0–44)
AST SERPL-CCNC: 21 IU/L (ref 0–40)
BILIRUB SERPL-MCNC: 1.7 MG/DL (ref 0–1.2)
BUN SERPL-MCNC: 18 MG/DL (ref 8–27)
BUN/CREAT SERPL: 18 (ref 10–24)
CALCIUM SERPL-MCNC: 9.7 MG/DL (ref 8.6–10.2)
CHLORIDE SERPL-SCNC: 102 MMOL/L (ref 96–106)
CHOLEST SERPL-MCNC: 163 MG/DL (ref 100–199)
CO2 SERPL-SCNC: 23 MMOL/L (ref 20–29)
CREAT SERPL-MCNC: 1.01 MG/DL (ref 0.76–1.27)
GLOBULIN SER CALC-MCNC: 2.8 G/DL (ref 1.5–4.5)
GLUCOSE SERPL-MCNC: 111 MG/DL (ref 65–99)
HBA1C MFR BLD: 5.9 % (ref 4.8–5.6)
HDLC SERPL-MCNC: 45 MG/DL
LDLC SERPL CALC-MCNC: 96 MG/DL (ref 0–99)
LDLC/HDLC SERPL: 2.1 RATIO (ref 0–3.6)
POTASSIUM SERPL-SCNC: 4.7 MMOL/L (ref 3.5–5.2)
PROT SERPL-MCNC: 7.7 G/DL (ref 6–8.5)
SODIUM SERPL-SCNC: 142 MMOL/L (ref 134–144)
TRIGL SERPL-MCNC: 123 MG/DL (ref 0–149)
VLDLC SERPL CALC-MCNC: 22 MG/DL (ref 5–40)

## 2022-04-04 DIAGNOSIS — I10 ESSENTIAL HYPERTENSION: ICD-10-CM

## 2022-04-04 DIAGNOSIS — E11.9 TYPE 2 DIABETES MELLITUS WITHOUT COMPLICATION, WITHOUT LONG-TERM CURRENT USE OF INSULIN: ICD-10-CM

## 2022-04-04 RX ORDER — LISINOPRIL 10 MG/1
TABLET ORAL
Qty: 90 TABLET | Refills: 0 | Status: SHIPPED | OUTPATIENT
Start: 2022-04-04 | End: 2022-04-11 | Stop reason: SDUPTHER

## 2022-04-04 NOTE — TELEPHONE ENCOUNTER
Rx Refill Note  Requested Prescriptions     Pending Prescriptions Disp Refills   • lisinopril (PRINIVIL,ZESTRIL) 10 MG tablet [Pharmacy Med Name: Lisinopril 10 MG Oral Tablet] 90 tablet 0     Sig: Take 1 tablet by mouth once daily      Last office visit with prescribing clinician: 12/6/2021      Next office visit with prescribing clinician: 4/11/2022            Kimberlee Shook MA  04/04/22, 11:12 EDT

## 2022-04-11 ENCOUNTER — OFFICE VISIT (OUTPATIENT)
Dept: FAMILY MEDICINE CLINIC | Facility: CLINIC | Age: 77
End: 2022-04-11

## 2022-04-11 VITALS
HEIGHT: 69 IN | DIASTOLIC BLOOD PRESSURE: 70 MMHG | SYSTOLIC BLOOD PRESSURE: 118 MMHG | BODY MASS INDEX: 33.24 KG/M2 | TEMPERATURE: 97.7 F | HEART RATE: 72 BPM | WEIGHT: 224.4 LBS | OXYGEN SATURATION: 98 %

## 2022-04-11 DIAGNOSIS — E78.2 MIXED HYPERLIPIDEMIA: ICD-10-CM

## 2022-04-11 DIAGNOSIS — E66.01 MORBIDLY OBESE: ICD-10-CM

## 2022-04-11 DIAGNOSIS — Z79.899 HIGH RISK MEDICATION USE: ICD-10-CM

## 2022-04-11 DIAGNOSIS — Z63.4 LOSS OR DEATH OF PARTNER: ICD-10-CM

## 2022-04-11 DIAGNOSIS — I10 ESSENTIAL HYPERTENSION: ICD-10-CM

## 2022-04-11 DIAGNOSIS — Z00.00 MEDICARE ANNUAL WELLNESS VISIT, SUBSEQUENT: Primary | ICD-10-CM

## 2022-04-11 DIAGNOSIS — F43.21 GRIEVING: ICD-10-CM

## 2022-04-11 DIAGNOSIS — E11.9 TYPE 2 DIABETES MELLITUS WITHOUT COMPLICATION, WITHOUT LONG-TERM CURRENT USE OF INSULIN: ICD-10-CM

## 2022-04-11 PROCEDURE — 99214 OFFICE O/P EST MOD 30 MIN: CPT | Performed by: FAMILY MEDICINE

## 2022-04-11 PROCEDURE — G0439 PPPS, SUBSEQ VISIT: HCPCS | Performed by: FAMILY MEDICINE

## 2022-04-11 PROCEDURE — 1159F MED LIST DOCD IN RCRD: CPT | Performed by: FAMILY MEDICINE

## 2022-04-11 PROCEDURE — 1170F FXNL STATUS ASSESSED: CPT | Performed by: FAMILY MEDICINE

## 2022-04-11 RX ORDER — LISINOPRIL 10 MG/1
10 TABLET ORAL DAILY
Qty: 90 TABLET | Refills: 3 | Status: SHIPPED | OUTPATIENT
Start: 2022-04-11 | End: 2023-03-27 | Stop reason: SDUPTHER

## 2022-04-11 SDOH — SOCIAL STABILITY - SOCIAL INSECURITY: DISSAPEARANCE AND DEATH OF FAMILY MEMBER: Z63.4

## 2022-04-11 NOTE — PROGRESS NOTES
The ABCs of the Annual Wellness Visit  Subsequent Medicare Wellness Visit    Chief Complaint   Patient presents with   • Medicare Wellness-subsequent      Subjective    History of Present Illness:  Summer Condon Jr. is a 76 y.o. male who presents for a Subsequent Medicare Wellness Visit.  Patient is also here today to follow-up on his diabetes which is stable.  Labs today.  Hypertension stable on current medications.  Labs today.  Hyperlipidemia follow-up.  Labs today obesity follow-up she lost about 3 pounds.  Grieving, he lost his wife last week.  I offered him any type of antidepressant or anxiety medications but she states that he is managing on his own he has [employer] and family support  The following portions of the patient's history were reviewed and   updated as appropriate: allergies, current medications, past family history, past medical history, past social history, past surgical history and problem list.    Compared to one year ago, the patient feels his physical   health is the same.    Compared to one year ago, the patient feels his mental   health is worse.    Recent Hospitalizations:  He was not admitted to the hospital during the last year.       Current Medical Providers:  Patient Care Team:  Vicki Leon MD as PCP - General (Family Medicine)    Outpatient Medications Prior to Visit   Medication Sig Dispense Refill   • Accu-Chek Guide test strip To test blood sugar daily.        DX: E11.9 100 each 11   • baclofen (LIORESAL) 10 MG tablet Take 1 tablet by mouth 3 (Three) Times a Day. 30 tablet 11   • Lancets 28G misc 1 Units Daily. 100 each 6   • metFORMIN (GLUCOPHAGE) 1000 MG tablet TAKE 1 TABLET BY MOUTH TWICE DAILY WITH MEALS 180 tablet 3   • rosuvastatin (CRESTOR) 5 MG tablet Take 1 tablet by mouth every night at bedtime. 90 tablet 6   • lisinopril (PRINIVIL,ZESTRIL) 10 MG tablet Take 1 tablet by mouth once daily 90 tablet 0     No facility-administered medications prior to visit.       No  "opioid medication identified on active medication list. I have reviewed chart for other potential  high risk medication/s and harmful drug interactions in the elderly.          Aspirin is not on active medication list.  Aspirin use is not indicated based on review of current medical condition/s. Risk of harm outweighs potential benefits.  .    Patient Active Problem List   Diagnosis   • Right upper quadrant abdominal pain   • Hemorrhage of anus and rectum   • GI bleed   • Morbidly obese (HCC)   • Essential hypertension   • Hyperlipidemia   • Medicare annual wellness visit, subsequent   • Type 2 diabetes mellitus without complication, without long-term current use of insulin ([facility])   • Muscle cramps   • High risk medication use   • Loss or death of partner     Advance Care Planning  Advance Directive is not on file.  ACP discussion was held with the patient during this visit. Patient does not have an advance directive, information provided.    Review of Systems   Constitutional: Negative.         Objective    Vitals:    04/11/22 1111   BP: 118/70   Pulse: 72   Temp: 97.7 °F (36.5 °C)   SpO2: 98%   Weight: 102 kg (224 lb 6.4 oz)   Height: 175.3 cm (69.02\")     BMI Readings from Last 1 Encounters:   04/11/22 33.12 kg/m²   BMI is above normal parameters. Recommendations include: nutrition counseling    Does the patient have evidence of cognitive impairment? No    Physical Exam  Vitals and nursing note reviewed.   Constitutional:       General: He is not in acute distress.     Appearance: He is well-developed. He is obese. He is not diaphoretic.   Cardiovascular:      Rate and Rhythm: Normal rate and regular rhythm.   Pulmonary:      Effort: Pulmonary effort is normal. No respiratory distress.      Breath sounds: Normal breath sounds. No wheezing.   Neurological:      Mental Status: He is alert.                 HEALTH RISK ASSESSMENT    Smoking Status:  Social History     Tobacco Use   Smoking Status Former Smoker   • " Start date:    • Quit date:    • Years since quittin.3   Smokeless Tobacco Never Used   Tobacco Comment    smokes 1 to 2 packs per day for 10 years     Alcohol Consumption:  Social History     Substance and Sexual Activity   Alcohol Use Yes   • Alcohol/week: 1.0 standard drink   • Types: 1 Cans of beer per week    Comment: drinks 7 or less drinks per week     Fall Risk Screen:    YENNIFER Fall Risk Assessment was completed, and patient is at LOW risk for falls.Assessment completed on:2022    Depression Screening:  PHQ-2/PHQ-9 Depression Screening 2022   Retired PHQ-9 Total Score -   Retired Total Score -   Little Interest or Pleasure in Doing Things 0-->not at all   PHQ-9: Brief Depression Severity Measure Score 0       Health Habits and Functional and Cognitive Screening:  Functional & Cognitive Status 2022   Do you have difficulty preparing food and eating? No   Do you have difficulty bathing yourself, getting dressed or grooming yourself? No   Do you have difficulty using the toilet? Yes   Do you have difficulty moving around from place to place? No   Do you have trouble with steps or getting out of a bed or a chair? No   Current Diet Unhealthy Diet   Dental Exam Up to date   Eye Exam Up to date   Exercise (times per week) 0 times per week   Current Exercises Include No Regular Exercise   Current Exercise Activities Include -   Do you need help using the phone?  No   Are you deaf or do you have serious difficulty hearing?  Yes   Do you need help with transportation? No   Do you need help shopping? No   Do you need help preparing meals?  No   Do you need help with housework?  No   Do you need help with laundry? No   Do you need help taking your medications? No   Do you need help managing money? No   Do you ever drive or ride in a car without wearing a seat belt? No   Have you felt unusual stress, anger or loneliness in the last month? Yes   Who do you live with? Alone   If you need help,  do you have trouble finding someone available to you? No   Have you been bothered in the last four weeks by sexual problems? -   Do you have difficulty concentrating, remembering or making decisions? No       Age-appropriate Screening Schedule:  Refer to the list below for future screening recommendations based on patient's age, sex and/or medical conditions. Orders for these recommended tests are listed in the plan section. The patient has been provided with a written plan.    Health Maintenance   Topic Date Due   • TDAP/TD VACCINES (1 - Tdap) Never done   • DIABETIC FOOT EXAM  02/28/2021   • URINE MICROALBUMIN  04/02/2022   • HEMOGLOBIN A1C  06/06/2022   • INFLUENZA VACCINE  08/01/2022   • LIPID PANEL  12/06/2022   • DIABETIC EYE EXAM  12/08/2022   • ZOSTER VACCINE  Completed              Assessment/Plan   CMS Preventative Services Quick Reference  Risk Factors Identified During Encounter  Fall Risk-High or Moderate  The above risks/problems have been discussed with the patient.  Follow up actions/plans if indicated are seen below in the Assessment/Plan Section.  Pertinent information has been shared with the patient in the After Visit Summary.    Diagnoses and all orders for this visit:    1. Medicare annual wellness visit, subsequent (Primary)    2. Type 2 diabetes mellitus without complication, without long-term current use of insulin ([facility])  Comments:  Stable.  Last A1c was 6.2.  Continue current medications.  Labs today  Orders:  -     lisinopril (PRINIVIL,ZESTRIL) 10 MG tablet; Take 1 tablet by mouth Daily.  Dispense: 90 tablet; Refill: 3  -     Hemoglobin A1c  -     Comprehensive Metabolic Panel  -     Lipid Panel With LDL / HDL Ratio  -     Microalbumin / Creatinine Urine Ratio - Urine, Clean Catch  -     Ambulatory Referral to Podiatry    3. Essential hypertension  Comments:    Controlled.  He was not taking his medications this morning.  Labs today.  Continue current medications  Orders:  -     lisinopril  (PRINIVIL,ZESTRIL) 10 MG tablet; Take 1 tablet by mouth Daily.  Dispense: 90 tablet; Refill: 3  -     Comprehensive Metabolic Panel  -     Lipid Panel With LDL / HDL Ratio    4. Mixed hyperlipidemia    5. High risk medication use    6. Morbidly obese (HCC)    7. Grieving    8. Loss or death of partner      Continue all current medications  Follow Up:      Return in about 4 months (around 8/11/2022) for HYPERTENSION, HYPERLIPIDEMIA, DIABETES.    An After Visit Summary and PPPS were made available to the patient.

## 2022-04-12 LAB
ALBUMIN SERPL-MCNC: 4.1 G/DL (ref 3.7–4.7)
ALBUMIN/CREAT UR: <4 MG/G CREAT (ref 0–29)
ALBUMIN/GLOB SERPL: 1.5 {RATIO} (ref 1.2–2.2)
ALP SERPL-CCNC: 58 IU/L (ref 44–121)
ALT SERPL-CCNC: 18 IU/L (ref 0–44)
AST SERPL-CCNC: 19 IU/L (ref 0–40)
BILIRUB SERPL-MCNC: 1.2 MG/DL (ref 0–1.2)
BUN SERPL-MCNC: 16 MG/DL (ref 8–27)
BUN/CREAT SERPL: 18 (ref 10–24)
CALCIUM SERPL-MCNC: 9.3 MG/DL (ref 8.6–10.2)
CHLORIDE SERPL-SCNC: 100 MMOL/L (ref 96–106)
CHOLEST SERPL-MCNC: 143 MG/DL (ref 100–199)
CO2 SERPL-SCNC: 25 MMOL/L (ref 20–29)
CREAT SERPL-MCNC: 0.91 MG/DL (ref 0.76–1.27)
CREAT UR-MCNC: 83.3 MG/DL
EGFRCR SERPLBLD CKD-EPI 2021: 88 ML/MIN/1.73
GLOBULIN SER CALC-MCNC: 2.8 G/DL (ref 1.5–4.5)
GLUCOSE SERPL-MCNC: 101 MG/DL (ref 65–99)
HBA1C MFR BLD: 6 % (ref 4.8–5.6)
HDLC SERPL-MCNC: 46 MG/DL
LDLC SERPL CALC-MCNC: 71 MG/DL (ref 0–99)
LDLC/HDLC SERPL: 1.5 RATIO (ref 0–3.6)
MICROALBUMIN UR-MCNC: <3 UG/ML
POTASSIUM SERPL-SCNC: 5 MMOL/L (ref 3.5–5.2)
PROT SERPL-MCNC: 6.9 G/DL (ref 6–8.5)
SODIUM SERPL-SCNC: 139 MMOL/L (ref 134–144)
TRIGL SERPL-MCNC: 148 MG/DL (ref 0–149)
VLDLC SERPL CALC-MCNC: 26 MG/DL (ref 5–40)

## 2022-08-11 ENCOUNTER — OFFICE VISIT (OUTPATIENT)
Dept: FAMILY MEDICINE CLINIC | Facility: CLINIC | Age: 77
End: 2022-08-11

## 2022-08-11 VITALS
WEIGHT: 231.4 LBS | TEMPERATURE: 97.8 F | SYSTOLIC BLOOD PRESSURE: 144 MMHG | BODY MASS INDEX: 34.27 KG/M2 | OXYGEN SATURATION: 95 % | HEIGHT: 69 IN | HEART RATE: 60 BPM | DIASTOLIC BLOOD PRESSURE: 87 MMHG

## 2022-08-11 DIAGNOSIS — I10 ESSENTIAL HYPERTENSION: ICD-10-CM

## 2022-08-11 DIAGNOSIS — E11.9 TYPE 2 DIABETES MELLITUS WITHOUT COMPLICATION, WITHOUT LONG-TERM CURRENT USE OF INSULIN: Primary | ICD-10-CM

## 2022-08-11 DIAGNOSIS — E78.2 MIXED HYPERLIPIDEMIA: ICD-10-CM

## 2022-08-11 DIAGNOSIS — Z79.899 HIGH RISK MEDICATION USE: ICD-10-CM

## 2022-08-11 PROCEDURE — 99214 OFFICE O/P EST MOD 30 MIN: CPT | Performed by: FAMILY MEDICINE

## 2022-08-11 RX ORDER — ROSUVASTATIN CALCIUM 5 MG/1
5 TABLET, COATED ORAL
Qty: 90 TABLET | Refills: 6 | Status: SHIPPED | OUTPATIENT
Start: 2022-08-11

## 2022-08-11 RX ORDER — BLOOD SUGAR DIAGNOSTIC
STRIP MISCELLANEOUS
Qty: 100 EACH | Refills: 11 | Status: SHIPPED | OUTPATIENT
Start: 2022-08-11

## 2022-08-11 RX ORDER — LANCETS 23 GAUGE
1 EACH MISCELLANEOUS DAILY
Qty: 100 EACH | Refills: 6 | Status: SHIPPED | OUTPATIENT
Start: 2022-08-11

## 2022-08-11 NOTE — PROGRESS NOTES
Subjective   Summer Condon Jr. is a 76 y.o. male.     Chief Complaint   Patient presents with   • Hypertension   • Hyperlipidemia       History of Present Illness   HTN: stable at home, but borderline in the office.  DM: stable, under control, ambulatory blood sugars reviewed with the patient on his glucometer  Hyperlipidemia: stable  Continue current medications  Due for labs today      The following portions of the patient's history were reviewed and updated as appropriate: allergies, current medications, past family history, past medical history, past social history, past surgical history and problem list.    Past Medical History:   Diagnosis Date   • Abdominal pain of multiple sites    • Abdominal pain, RUQ (right upper quadrant)    • Acute sinusitis    • Allergic rhinitis    • Arthritis    • Benign essential hypertension    • BMI 35.0-35.9,adult    • Cholelithiasis    • Condyloma acuminata    • Cough    • Elevated cholesterol    • Encounter for postoperative wound check    • Flu-like symptoms    • Former smoker     smokes 1 to 2 packs per day for 10 years   • GERD (gastroesophageal reflux disease)    • GI bleed    • Hemorrhoids    • High blood pressure    • High cholesterol    • History of allergy    • History of cataract    • History of colon polyps    • History of long-term treatment with high-risk medication    • Hyperglycemia    • Hyperlipidemia    • Hypertension    • Influenza A    • Internal hemorrhoids with complication    • Morbidly obese (HCC)    • Myalgia    • Myositis    • Nasal congestion    • No post-op complications    • Prediabetes    • Pruritus ani    • Skin lesion    • Sore throat        Past Surgical History:   Procedure Laterality Date   • CATARACT EXTRACTION     • CHOLECYSTECTOMY     • COLONOSCOPY  11/17/2014    Reid Yousif MD   • COLONOSCOPY N/A 10/26/2018    Procedure: COLONOSCOPY TO CECUM AND INTO TERMINAL ILEUM;  Surgeon: Reid Yousif MD;  Location: [facility] ENDOSCOPY;  Service:  Gastroenterology   • ELBOW ARTHROPLASTY         Family History   Problem Relation Age of Onset   • Arthritis Father    • No Known Problems Other        Social History     Socioeconomic History   • Marital status:    Tobacco Use   • Smoking status: Former Smoker     Start date:      Quit date:      Years since quittin.6   • Smokeless tobacco: Never Used   • Tobacco comment: smokes 1 to 2 packs per day for 10 years   Substance and Sexual Activity   • Alcohol use: Yes     Alcohol/week: 1.0 standard drink     Types: 1 Cans of beer per week     Comment: drinks 7 or less drinks per week   • Drug use: No   • Sexual activity: Not Currently     Partners: Female       Current Outpatient Medications on File Prior to Visit   Medication Sig Dispense Refill   • baclofen (LIORESAL) 10 MG tablet Take 1 tablet by mouth 3 (Three) Times a Day. 30 tablet 11   • lisinopril (PRINIVIL,ZESTRIL) 10 MG tablet Take 1 tablet by mouth Daily. 90 tablet 3   • [DISCONTINUED] Accu-Chek Guide test strip To test blood sugar daily.        DX: E11.9 100 each 11   • [DISCONTINUED] Lancets 28G misc 1 Units Daily. 100 each 6   • [DISCONTINUED] metFORMIN (GLUCOPHAGE) 1000 MG tablet TAKE 1 TABLET BY MOUTH TWICE DAILY WITH MEALS 180 tablet 3   • [DISCONTINUED] rosuvastatin (CRESTOR) 5 MG tablet Take 1 tablet by mouth every night at bedtime. 90 tablet 6     No current facility-administered medications on file prior to visit.       Review of Systems   Constitutional: Negative.        Recent Results (from the past 4704 hour(s))   Hemoglobin A1c    Collection Time: 22 11:45 AM    Specimen: Blood   Result Value Ref Range    Hemoglobin A1C 6.0 (H) 4.8 - 5.6 %   Comprehensive Metabolic Panel    Collection Time: 22 11:45 AM    Specimen: Blood   Result Value Ref Range    Glucose 101 (H) 65 - 99 mg/dL    BUN 16 8 - 27 mg/dL    Creatinine 0.91 0.76 - 1.27 mg/dL    EGFR Result 88 >59 mL/min/1.73    BUN/Creatinine Ratio 18 10 - 24     "Sodium 139 134 - 144 mmol/L    Potassium 5.0 3.5 - 5.2 mmol/L    Chloride 100 96 - 106 mmol/L    Total CO2 25 20 - 29 mmol/L    Calcium 9.3 8.6 - 10.2 mg/dL    Total Protein 6.9 6.0 - 8.5 g/dL    Albumin 4.1 3.7 - 4.7 g/dL    Globulin 2.8 1.5 - 4.5 g/dL    A/G Ratio 1.5 1.2 - 2.2    Total Bilirubin 1.2 0.0 - 1.2 mg/dL    Alkaline Phosphatase 58 44 - 121 IU/L    AST (SGOT) 19 0 - 40 IU/L    ALT (SGPT) 18 0 - 44 IU/L   Lipid Panel With LDL / HDL Ratio    Collection Time: 04/11/22 11:45 AM    Specimen: Blood   Result Value Ref Range    Total Cholesterol 143 100 - 199 mg/dL    Triglycerides 148 0 - 149 mg/dL    HDL Cholesterol 46 >39 mg/dL    VLDL Cholesterol Ceferino 26 5 - 40 mg/dL    LDL Chol Calc (NIH) 71 0 - 99 mg/dL    LDL/HDL RATIO 1.5 0.0 - 3.6 ratio   Microalbumin / Creatinine Urine Ratio - Urine, Clean Catch    Collection Time: 04/11/22 11:45 AM    Specimen: Urine, Clean Catch   Result Value Ref Range    Creatinine, Urine 83.3 Not Estab. mg/dL    Microalbumin, Urine <3.0 Not Estab. ug/mL    Microalbumin/Creatinine Ratio <4 0 - 29 mg/g creat     Objective   Vitals:    08/11/22 1112   BP: 144/87   BP Location: Right arm   Patient Position: Sitting   Pulse: 60   Temp: 97.8 °F (36.6 °C)   SpO2: 95%   Weight: 105 kg (231 lb 6.4 oz)   Height: 175.3 cm (69.02\")     Body mass index is 34.16 kg/m².  Physical Exam  Vitals and nursing note reviewed.   Constitutional:       General: He is not in acute distress.     Appearance: He is well-developed. He is not diaphoretic.   Cardiovascular:      Rate and Rhythm: Normal rate and regular rhythm.   Pulmonary:      Effort: Pulmonary effort is normal. No respiratory distress.      Breath sounds: Normal breath sounds. No wheezing.           Diagnoses and all orders for this visit:    1. Type 2 diabetes mellitus without complication, without long-term current use of insulin (HCC) (Primary)  Comments:  Stable.  Last A1c was 6.0.  Continue current medications.  Labs today  Orders:  -     " rosuvastatin (CRESTOR) 5 MG tablet; Take 1 tablet by mouth every night at bedtime.  Dispense: 90 tablet; Refill: 6  -     metFORMIN (GLUCOPHAGE) 1000 MG tablet; TAKE 1 TABLET BY MOUTH TWICE DAILY WITH MEALS  Dispense: 180 tablet; Refill: 3  -     Lancets 28G misc; 1 Units Daily.  Dispense: 100 each; Refill: 6  -     Hemoglobin A1c  -     Comprehensive Metabolic Panel  -     Lipid Panel With LDL / HDL Ratio  -     Microalbumin / Creatinine Urine Ratio - Urine, Clean Catch  -     Ambulatory Referral to Podiatry    2. Essential hypertension  -     Comprehensive Metabolic Panel  -     Lipid Panel With LDL / HDL Ratio  -     Microalbumin / Creatinine Urine Ratio - Urine, Clean Catch    3. Mixed hyperlipidemia  -     Comprehensive Metabolic Panel  -     Lipid Panel With LDL / HDL Ratio    4. High risk medication use    Other orders  -     Accu-Chek Guide test strip; To test blood sugar daily.        DX: E11.9  Dispense: 100 each; Refill: 11      Return in about 4 weeks (around 9/8/2022) for DIABETES.

## 2022-08-12 LAB
ALBUMIN SERPL-MCNC: 4.7 G/DL (ref 3.7–4.7)
ALBUMIN/CREAT UR: <5 MG/G CREAT (ref 0–29)
ALBUMIN/GLOB SERPL: 1.7 {RATIO} (ref 1.2–2.2)
ALP SERPL-CCNC: 59 IU/L (ref 44–121)
ALT SERPL-CCNC: 20 IU/L (ref 0–44)
AST SERPL-CCNC: 23 IU/L (ref 0–40)
BILIRUB SERPL-MCNC: 1.1 MG/DL (ref 0–1.2)
BUN SERPL-MCNC: 13 MG/DL (ref 8–27)
BUN/CREAT SERPL: 13 (ref 10–24)
CALCIUM SERPL-MCNC: 9.5 MG/DL (ref 8.6–10.2)
CHLORIDE SERPL-SCNC: 101 MMOL/L (ref 96–106)
CHOLEST SERPL-MCNC: 161 MG/DL (ref 100–199)
CO2 SERPL-SCNC: 23 MMOL/L (ref 20–29)
CREAT SERPL-MCNC: 0.99 MG/DL (ref 0.76–1.27)
CREAT UR-MCNC: 56.4 MG/DL
EGFRCR SERPLBLD CKD-EPI 2021: 80 ML/MIN/1.73
GLOBULIN SER CALC-MCNC: 2.7 G/DL (ref 1.5–4.5)
GLUCOSE SERPL-MCNC: 112 MG/DL (ref 65–99)
HBA1C MFR BLD: 5.9 % (ref 4.8–5.6)
HDLC SERPL-MCNC: 45 MG/DL
LDLC SERPL CALC-MCNC: 92 MG/DL (ref 0–99)
LDLC/HDLC SERPL: 2 RATIO (ref 0–3.6)
MICROALBUMIN UR-MCNC: <3 UG/ML
POTASSIUM SERPL-SCNC: 4.5 MMOL/L (ref 3.5–5.2)
PROT SERPL-MCNC: 7.4 G/DL (ref 6–8.5)
SODIUM SERPL-SCNC: 139 MMOL/L (ref 134–144)
TRIGL SERPL-MCNC: 134 MG/DL (ref 0–149)
VLDLC SERPL CALC-MCNC: 24 MG/DL (ref 5–40)

## 2022-12-27 DIAGNOSIS — E11.9 TYPE 2 DIABETES MELLITUS WITHOUT COMPLICATION, WITHOUT LONG-TERM CURRENT USE OF INSULIN: ICD-10-CM

## 2022-12-27 NOTE — TELEPHONE ENCOUNTER
Rx Refill Note  Requested Prescriptions     Pending Prescriptions Disp Refills   • metFORMIN (GLUCOPHAGE) 1000 MG tablet [Pharmacy Med Name: metFORMIN HCl 1000 MG Oral Tablet] 180 tablet 0     Sig: TAKE 1 TABLET BY MOUTH TWICE DAILY WITH MEALS      Last office visit with prescribing clinician: 8/11/2022   Last telemedicine visit with prescribing clinician: 12/30/2022   Next office visit with prescribing clinician: Visit date not found                         Would you like a call back once the refill request has been completed: [] Yes [] No    If the office needs to give you a call back, can they leave a voicemail: [] Yes [] No    Kimberlee Shook MA  12/27/22, 07:58 EST

## 2022-12-30 ENCOUNTER — OFFICE VISIT (OUTPATIENT)
Dept: FAMILY MEDICINE CLINIC | Facility: CLINIC | Age: 77
End: 2022-12-30

## 2022-12-30 VITALS
WEIGHT: 235 LBS | DIASTOLIC BLOOD PRESSURE: 68 MMHG | HEART RATE: 57 BPM | BODY MASS INDEX: 34.8 KG/M2 | SYSTOLIC BLOOD PRESSURE: 116 MMHG | TEMPERATURE: 97.3 F | HEIGHT: 69 IN | OXYGEN SATURATION: 97 %

## 2022-12-30 DIAGNOSIS — I73.9 PERIPHERAL VASCULAR DISEASE: ICD-10-CM

## 2022-12-30 DIAGNOSIS — E11.69 TYPE 2 DIABETES MELLITUS WITH OTHER SPECIFIED COMPLICATION, WITHOUT LONG-TERM CURRENT USE OF INSULIN: Primary | ICD-10-CM

## 2022-12-30 DIAGNOSIS — M19.90 ARTHRITIS: ICD-10-CM

## 2022-12-30 DIAGNOSIS — E78.2 MIXED HYPERLIPIDEMIA: ICD-10-CM

## 2022-12-30 DIAGNOSIS — I10 ESSENTIAL HYPERTENSION: ICD-10-CM

## 2022-12-30 PROBLEM — M20.42 ACQUIRED HAMMER TOE OF LEFT FOOT: Status: ACTIVE | Noted: 2022-12-30

## 2022-12-30 PROBLEM — M20.41 ACQUIRED HAMMER TOE OF RIGHT FOOT: Status: ACTIVE | Noted: 2022-12-30

## 2022-12-30 PROCEDURE — 99214 OFFICE O/P EST MOD 30 MIN: CPT | Performed by: STUDENT IN AN ORGANIZED HEALTH CARE EDUCATION/TRAINING PROGRAM

## 2022-12-30 RX ORDER — MELOXICAM 7.5 MG/1
15 TABLET ORAL DAILY
Qty: 90 TABLET | Refills: 2 | Status: SHIPPED | OUTPATIENT
Start: 2022-12-30 | End: 2023-03-16

## 2022-12-30 NOTE — PROGRESS NOTES
"Chief Complaint  Diabetes (4 month follow up on diabetes)    Subjective        Summer Condon Jr. presents to [facility]  History of Present Illness    Transfer from Dr. Loen.     Missed appt with derm last year as his wife was sick.   Was started on meloxicam. Does not take it every day but does help a lot with his arthritis when he takes it.     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 Stopped drinking soda and did well for years.Gained weight a few years ago and was officially diagnosed with diabetes. Went up to A1C of 7.1 or 7.2.   Glucose checks at home:  Last A1c: 5.9  Hypoglycemic episodes: none   Retinal exam: due for repeat eye exam again in January   Urine micro: done and normal   Foot exam: due, occasionally feet will go to sleep but otherwise does well   Last creatinine: normal   Current medications: metformin 1000 BID but does sometimes take a 1/2 dose for diarrhea   On a statin: yes       Objective    Vital Signs:  /68 (BP Location: Right arm, Patient Position: Sitting, Cuff Size: Large Adult)   Pulse 57   Temp 97.3 °F (36.3 °C)   Ht 175.3 cm (69.02\")   Wt 107 kg (235 lb)   SpO2 97%   BMI 34.69 kg/m²   Estimated body mass index is 34.69 kg/m² as calculated from the following:    Height as of this encounter: 175.3 cm (69.02\").    Weight as of this encounter: 107 kg (235 lb).          Physical Exam  Vitals and nursing note reviewed.   Constitutional:       General: He is not in acute distress.     Appearance: Normal appearance. He is not ill-appearing.   HENT:      Head: Normocephalic and atraumatic.      Nose: Nose normal.      Mouth/Throat:      Mouth: Mucous membranes are moist.   Eyes:      Extraocular Movements: Extraocular movements intact.      Conjunctiva/sclera: Conjunctivae normal.   Cardiovascular:      Rate and Rhythm: Normal rate and regular rhythm.      Heart sounds: Normal heart sounds. No " murmur heard.    No gallop.   Pulmonary:      Effort: Pulmonary effort is normal. No respiratory distress.      Breath sounds: Normal breath sounds. No stridor. No wheezing, rhonchi or rales.   Chest:      Chest wall: No tenderness.   Feet:      Right foot:      Protective Sensation: 3 sites tested. 3 sites sensed.      Skin integrity: Skin integrity normal.      Toenail Condition: Right toenails are normal.      Left foot:      Protective Sensation: 3 sites tested. 3 sites sensed.      Skin integrity: Skin integrity normal.      Toenail Condition: Left toenails are normal.      Comments: Mild reduced sensation back of heel   Decreased flow left foot   Skin:     General: Skin is warm and dry.   Neurological:      General: No focal deficit present.      Mental Status: He is alert and oriented to person, place, and time. Mental status is at baseline.   Psychiatric:         Mood and Affect: Mood normal.         Behavior: Behavior normal.        Result Review :                Assessment and Plan   Diagnoses and all orders for this visit:    1. Type 2 diabetes mellitus with other specified complication, without long-term current use of insulin (HCC) (Primary)  -     Hemoglobin A1c  -     Comprehensive metabolic panel    2. Arthritis  -     meloxicam (Mobic) 7.5 MG tablet; Take 2 tablets by mouth Daily.  Dispense: 90 tablet; Refill: 2    3. Essential hypertension    4. Mixed hyperlipidemia    5. Peripheral vascular disease (HCC)  -     Ambulatory Referral to Vascular Surgery    DM: due for labs today. A1C last previously well controlled. Foot exam today and normal.     HTN: blood pressure at goal today, no changes     PVD: referral to vascular          Follow Up   Return in about 4 months (around 4/30/2023) for DM.  Patient was given instructions and counseling regarding his condition or for health maintenance advice. Please see specific information pulled into the AVS if appropriate.        Normal rate, regular rhythm.  Heart sounds S1, S2.

## 2022-12-31 LAB
ALBUMIN SERPL-MCNC: 4.4 G/DL (ref 3.5–5.2)
ALBUMIN/GLOB SERPL: 1.6 G/DL
ALP SERPL-CCNC: 56 U/L (ref 39–117)
ALT SERPL-CCNC: 18 U/L (ref 1–41)
AST SERPL-CCNC: 19 U/L (ref 1–40)
BILIRUB SERPL-MCNC: 1.1 MG/DL (ref 0–1.2)
BUN SERPL-MCNC: 15 MG/DL (ref 8–23)
BUN/CREAT SERPL: 15.6 (ref 7–25)
CALCIUM SERPL-MCNC: 9.2 MG/DL (ref 8.6–10.5)
CHLORIDE SERPL-SCNC: 102 MMOL/L (ref 98–107)
CO2 SERPL-SCNC: 28.6 MMOL/L (ref 22–29)
CREAT SERPL-MCNC: 0.96 MG/DL (ref 0.76–1.27)
EGFRCR SERPLBLD CKD-EPI 2021: 82.4 ML/MIN/1.73
GLOBULIN SER CALC-MCNC: 2.7 GM/DL
GLUCOSE SERPL-MCNC: 110 MG/DL (ref 65–99)
HBA1C MFR BLD: 5.9 % (ref 4.8–5.6)
POTASSIUM SERPL-SCNC: 4.8 MMOL/L (ref 3.5–5.2)
PROT SERPL-MCNC: 7.1 G/DL (ref 6–8.5)
SODIUM SERPL-SCNC: 139 MMOL/L (ref 136–145)

## 2023-01-25 ENCOUNTER — TRANSCRIBE ORDERS (OUTPATIENT)
Dept: VASCULAR SURGERY | Facility: HOSPITAL | Age: 78
End: 2023-01-25
Payer: MEDICARE

## 2023-01-25 DIAGNOSIS — I73.9 PAD (PERIPHERAL ARTERY DISEASE): Primary | ICD-10-CM

## 2023-03-15 DIAGNOSIS — M19.90 ARTHRITIS: ICD-10-CM

## 2023-03-16 RX ORDER — MELOXICAM 7.5 MG/1
TABLET ORAL
Qty: 180 TABLET | Refills: 0 | Status: SHIPPED | OUTPATIENT
Start: 2023-03-16

## 2023-03-16 NOTE — TELEPHONE ENCOUNTER
LOV             12/30/2022   NOV             5/3/2023   Last RF        12/30/22    Yesica Shaw, JO-ANNA/LMR

## 2023-03-27 DIAGNOSIS — I10 ESSENTIAL HYPERTENSION: ICD-10-CM

## 2023-03-27 DIAGNOSIS — E11.9 TYPE 2 DIABETES MELLITUS WITHOUT COMPLICATION, WITHOUT LONG-TERM CURRENT USE OF INSULIN: ICD-10-CM

## 2023-03-27 RX ORDER — LISINOPRIL 10 MG/1
10 TABLET ORAL DAILY
Qty: 90 TABLET | Refills: 0 | Status: SHIPPED | OUTPATIENT
Start: 2023-03-27

## 2023-03-27 NOTE — TELEPHONE ENCOUNTER
LOV             12/30/2022   NOV             5/3/2023   Last RF        4/2022 1 YR    Yesica Shaw, JO-ANNA/LMR

## 2023-05-03 ENCOUNTER — OFFICE VISIT (OUTPATIENT)
Dept: FAMILY MEDICINE CLINIC | Facility: CLINIC | Age: 78
End: 2023-05-03
Payer: MEDICARE

## 2023-05-03 VITALS
BODY MASS INDEX: 34.69 KG/M2 | HEART RATE: 67 BPM | OXYGEN SATURATION: 94 % | DIASTOLIC BLOOD PRESSURE: 67 MMHG | TEMPERATURE: 95.6 F | SYSTOLIC BLOOD PRESSURE: 133 MMHG | WEIGHT: 235 LBS

## 2023-05-03 DIAGNOSIS — I10 ESSENTIAL HYPERTENSION: ICD-10-CM

## 2023-05-03 DIAGNOSIS — E66.01 MORBIDLY OBESE: ICD-10-CM

## 2023-05-03 DIAGNOSIS — I73.9 PERIPHERAL VASCULAR DISEASE: ICD-10-CM

## 2023-05-03 DIAGNOSIS — E11.9 TYPE 2 DIABETES MELLITUS WITHOUT COMPLICATION, WITHOUT LONG-TERM CURRENT USE OF INSULIN: ICD-10-CM

## 2023-05-03 DIAGNOSIS — Z00.00 ENCOUNTER FOR SUBSEQUENT ANNUAL WELLNESS VISIT (AWV) IN MEDICARE PATIENT: Primary | ICD-10-CM

## 2023-05-03 DIAGNOSIS — E11.69 TYPE 2 DIABETES MELLITUS WITH OTHER SPECIFIED COMPLICATION, WITHOUT LONG-TERM CURRENT USE OF INSULIN: ICD-10-CM

## 2023-05-03 DIAGNOSIS — M19.90 ARTHRITIS: ICD-10-CM

## 2023-05-03 RX ORDER — ROSUVASTATIN CALCIUM 5 MG/1
5 TABLET, COATED ORAL
Qty: 90 TABLET | Refills: 6 | Status: SHIPPED | OUTPATIENT
Start: 2023-05-03

## 2023-05-03 RX ORDER — MELOXICAM 7.5 MG/1
15 TABLET ORAL DAILY
Qty: 180 TABLET | Refills: 0 | Status: SHIPPED | OUTPATIENT
Start: 2023-05-03

## 2023-05-03 RX ORDER — BACLOFEN 10 MG/1
10 TABLET ORAL 3 TIMES DAILY
COMMUNITY
End: 2023-05-03 | Stop reason: SDUPTHER

## 2023-05-03 RX ORDER — BACLOFEN 10 MG/1
10 TABLET ORAL 3 TIMES DAILY
Qty: 30 TABLET | Refills: 0 | Status: SHIPPED | OUTPATIENT
Start: 2023-05-03

## 2023-05-03 RX ORDER — LISINOPRIL 10 MG/1
10 TABLET ORAL DAILY
Qty: 90 TABLET | Refills: 0 | Status: SHIPPED | OUTPATIENT
Start: 2023-05-03

## 2023-05-03 NOTE — PROGRESS NOTES
The ABCs of the Annual Wellness Visit  Subsequent Medicare Wellness Visit    Subjective    Summer Condon Jr. is a 77 y.o. male who presents for a Subsequent Medicare Wellness Visit.    The following portions of the patient's history were reviewed and   updated as appropriate: allergies, current medications, past family history, past medical history, past social history, past surgical history and problem list.    Compared to one year ago, the patient feels his physical   health is the same.    Compared to one year ago, the patient feels his mental   health is the same.    Recent Hospitalizations:  He was not admitted to the hospital during the last year.       Current Medical Providers:  Patient Care Team:  Aundrea Snell DO as PCP - General (Family Medicine)    Outpatient Medications Prior to Visit   Medication Sig Dispense Refill   • Accu-Chek Guide test strip To test blood sugar daily.        DX: E11.9 100 each 11   • Lancets 28G misc 1 Units Daily. 100 each 6   • lisinopril (PRINIVIL,ZESTRIL) 10 MG tablet Take 1 tablet by mouth Daily. 90 tablet 0   • meloxicam (MOBIC) 7.5 MG tablet TAKE 2 TABLETS EVERY  tablet 0   • metFORMIN (GLUCOPHAGE) 1000 MG tablet TAKE 1 TABLET BY MOUTH TWICE DAILY WITH MEALS 180 tablet 0   • rosuvastatin (CRESTOR) 5 MG tablet Take 1 tablet by mouth every night at bedtime. 90 tablet 6   • baclofen (LIORESAL) 10 MG tablet Take 1 tablet by mouth 3 (Three) Times a Day.       No facility-administered medications prior to visit.       No opioid medication identified on active medication list. I have reviewed chart for other potential  high risk medication/s and harmful drug interactions in the elderly.          Aspirin is not on active medication list.  Aspirin use is not indicated based on review of current medical condition/s. Risk of harm outweighs potential benefits.  .    Patient Active Problem List   Diagnosis   • Right upper quadrant abdominal pain   • Hemorrhage of anus and  "rectum   • GI bleed   • Morbidly obese   • Essential hypertension   • Hyperlipidemia   • Medicare annual wellness visit, subsequent   • Type 2 diabetes mellitus without complication (HCC)   • Muscle cramps   • High risk medication use   • Loss or death of partner   • Acquired hammer toe of left foot   • Acquired hammer toe of right foot     Advance Care Planning   Advance Care Planning     Advance Directive is not on file.  ACP discussion was held with the patient during this visit. Patient does not have an advance directive, information provided.     Objective    Vitals:    23 1106   BP: 133/67   BP Location: Left arm   Patient Position: Sitting   Cuff Size: Large Adult   Pulse: 67   Temp: 95.6 °F (35.3 °C)   SpO2: 94%   Weight: 107 kg (235 lb)   PainSc: 0-No pain     Estimated body mass index is 34.69 kg/m² as calculated from the following:    Height as of 22: 175.3 cm (69.02\").    Weight as of this encounter: 107 kg (235 lb).    BMI is >= 30 and <35. (Class 1 Obesity). The following options were offered after discussion;: discussed diet and exercise. Plan to restart fitness membership.       Does the patient have evidence of cognitive impairment? No    Lab Results   Component Value Date    HGBA1C 5.8 (H) 2023        HEALTH RISK ASSESSMENT    Smoking Status:  Social History     Tobacco Use   Smoking Status Former   • Packs/day: 1.00   • Years: 5.00   • Pack years: 5.00   • Types: Cigarettes   • Start date:    • Quit date:    • Years since quittin.3   Smokeless Tobacco Never   Tobacco Comments    smokes 1 to 2 packs per day for 10 years     Alcohol Consumption:  Social History     Substance and Sexual Activity   Alcohol Use Yes   • Alcohol/week: 1.0 standard drink   • Types: 1 Cans of beer per week    Comment: drinks 7 or less drinks per week     Fall Risk Screen:    YENNIFER Fall Risk Assessment was completed, and patient is at LOW risk for falls.Assessment completed " on:5/3/2023    Depression Screenin/3/2023    11:09 AM   PHQ-2/PHQ-9 Depression Screening   Little Interest or Pleasure in Doing Things 0-->not at all   Feeling Down, Depressed or Hopeless 0-->not at all   PHQ-9: Brief Depression Severity Measure Score 0       Health Habits and Functional and Cognitive Screenin/3/2023    11:00 AM   Functional & Cognitive Status   Do you have difficulty preparing food and eating? No   Do you have difficulty bathing yourself, getting dressed or grooming yourself? No   Do you have difficulty using the toilet? No   Do you have difficulty moving around from place to place? No   Do you have trouble with steps or getting out of a bed or a chair? No   Current Diet Well Balanced Diet   Dental Exam Up to date   Eye Exam Not up to date   Exercise (times per week) 0 times per week   Current Exercises Include No Regular Exercise   Do you need help using the phone?  No   Are you deaf or do you have serious difficulty hearing?  No   Do you need help with transportation? No   Do you need help shopping? No   Do you need help preparing meals?  No   Do you need help with housework?  No   Do you need help with laundry? No   Do you need help taking your medications? No   Do you need help managing money? No   Do you ever drive or ride in a car without wearing a seat belt? No   Have you felt unusual stress, anger or loneliness in the last month? No   Who do you live with? Spouse   If you need help, do you have trouble finding someone available to you? No   Have you been bothered in the last four weeks by sexual problems? No   Do you have difficulty concentrating, remembering or making decisions? No       Age-appropriate Screening Schedule:  Refer to the list below for future screening recommendations based on patient's age, sex and/or medical conditions. Orders for these recommended tests are listed in the plan section. The patient has been provided with a written plan.    Health  Maintenance   Topic Date Due   • TDAP/TD VACCINES (1 - Tdap) Never done   • DIABETIC EYE EXAM  12/08/2022   • INFLUENZA VACCINE  08/01/2023   • LIPID PANEL  08/11/2023   • URINE MICROALBUMIN  08/11/2023   • HEMOGLOBIN A1C  11/03/2023   • DIABETIC FOOT EXAM  12/30/2023   • ANNUAL WELLNESS VISIT  05/03/2024   • COLORECTAL CANCER SCREENING  10/26/2028   • COVID-19 Vaccine  Completed   • Pneumococcal Vaccine 65+  Completed   • ZOSTER VACCINE  Completed   • HEPATITIS C SCREENING  Discontinued   • AAA SCREEN (ONE-TIME)  Discontinued                  CMS Preventative Services Quick Reference  Risk Factors Identified During Encounter  obesity, diabetes, grief  The above risks/problems have been discussed with the patient.  Pertinent information has been shared with the patient in the After Visit Summary.  An After Visit Summary and PPPS were made available to the patient.    Follow Up:   Next Medicare Wellness visit to be scheduled in 1 year.       Additional E&M Note during same encounter follows:  Patient has multiple medical problems which are significant and separately identifiable that require additional work above and beyond the Medicare Wellness Visit.      Chief Complaint  Diabetes    Subjective        HPI  Summer JACKIE Condon Jr. is also being seen today for arthritis, neuropathy.     Has been getting some arthritis pain in his hip. Does wake him up sometimes at night. Usually worse on days he is less active. Played golf yesterday. Plays 2-3 days per week.  BP good today. Last A1C was well controlled. Saw vascular and had leg ultrasound - discussed possible medication and then sent him for some tests that are to be completed. Started sleeping in diabetic socks because his feet were bothering him at night and it seems to have helped.     Has had some of a dry cough that he relates to allergies but is on lisinopril. Will see if it improves.        Objective   Vital Signs:  /67 (BP Location: Left arm, Patient  Position: Sitting, Cuff Size: Large Adult)   Pulse 67   Temp 95.6 °F (35.3 °C)   Wt 107 kg (235 lb)   SpO2 94%   BMI 34.69 kg/m²     Physical Exam  Vitals and nursing note reviewed.   Constitutional:       General: He is not in acute distress.     Appearance: Normal appearance. He is not ill-appearing.   HENT:      Head: Normocephalic and atraumatic.      Nose: Nose normal.      Mouth/Throat:      Mouth: Mucous membranes are moist.   Eyes:      Extraocular Movements: Extraocular movements intact.      Conjunctiva/sclera: Conjunctivae normal.   Cardiovascular:      Rate and Rhythm: Normal rate and regular rhythm.      Heart sounds: Normal heart sounds. No murmur heard.    No gallop.   Pulmonary:      Effort: Pulmonary effort is normal. No respiratory distress.      Breath sounds: Normal breath sounds. No stridor. No wheezing, rhonchi or rales.   Chest:      Chest wall: No tenderness.   Skin:     General: Skin is warm and dry.   Neurological:      General: No focal deficit present.      Mental Status: He is alert and oriented to person, place, and time. Mental status is at baseline.   Psychiatric:         Mood and Affect: Mood normal.         Behavior: Behavior normal.                         Assessment and Plan   Diagnoses and all orders for this visit:    1. Encounter for subsequent annual wellness visit (AWV) in Medicare patient (Primary)    2. Type 2 diabetes mellitus without complication, without long-term current use of insulin  Comments:  controlled  Continue current medications.  Labs today  Orders:  -     lisinopril (PRINIVIL,ZESTRIL) 10 MG tablet; Take 1 tablet by mouth Daily.  Dispense: 90 tablet; Refill: 0  -     metFORMIN (GLUCOPHAGE) 1000 MG tablet; Take 1 tablet by mouth 2 (Two) Times a Day With Meals.  Dispense: 180 tablet; Refill: 0  -     rosuvastatin (CRESTOR) 5 MG tablet; Take 1 tablet by mouth every night at bedtime.  Dispense: 90 tablet; Refill: 6  -     Hemoglobin A1c  -     Comprehensive  metabolic panel    3. Essential hypertension  Comments:    Controlled.  Labs today.  Continue current medications  Orders:  -     lisinopril (PRINIVIL,ZESTRIL) 10 MG tablet; Take 1 tablet by mouth Daily.  Dispense: 90 tablet; Refill: 0  -     Comprehensive metabolic panel    4. Arthritis  -     meloxicam (MOBIC) 7.5 MG tablet; Take 2 tablets by mouth Daily.  Dispense: 180 tablet; Refill: 0    5. Type 2 diabetes mellitus without complication, without long-term current use of insulin  Comments:  Stable.  Continue current medications.  Labs today  Orders:  -     lisinopril (PRINIVIL,ZESTRIL) 10 MG tablet; Take 1 tablet by mouth Daily.  Dispense: 90 tablet; Refill: 0  -     metFORMIN (GLUCOPHAGE) 1000 MG tablet; Take 1 tablet by mouth 2 (Two) Times a Day With Meals.  Dispense: 180 tablet; Refill: 0  -     rosuvastatin (CRESTOR) 5 MG tablet; Take 1 tablet by mouth every night at bedtime.  Dispense: 90 tablet; Refill: 6  -     Hemoglobin A1c  -     Comprehensive metabolic panel    6. Type 2 diabetes mellitus with other specified complication, without long-term current use of insulin    7. Peripheral vascular disease    8. Morbidly obese    Other orders  -     baclofen (LIORESAL) 10 MG tablet; Take 1 tablet by mouth 3 (Three) Times a Day.  Dispense: 30 tablet; Refill: 0             Follow Up   Return in about 4 months (around 9/3/2023).  Patient was given instructions and counseling regarding his condition or for health maintenance advice. Please see specific information pulled into the AVS if appropriate.

## 2023-05-04 LAB
ALBUMIN SERPL-MCNC: 4.3 G/DL (ref 3.7–4.7)
ALBUMIN/GLOB SERPL: 2 {RATIO} (ref 1.2–2.2)
ALP SERPL-CCNC: 57 IU/L (ref 44–121)
ALT SERPL-CCNC: 18 IU/L (ref 0–44)
AST SERPL-CCNC: 22 IU/L (ref 0–40)
BILIRUB SERPL-MCNC: 1.5 MG/DL (ref 0–1.2)
BUN SERPL-MCNC: 20 MG/DL (ref 8–27)
BUN/CREAT SERPL: 20 (ref 10–24)
CALCIUM SERPL-MCNC: 9.2 MG/DL (ref 8.6–10.2)
CHLORIDE SERPL-SCNC: 104 MMOL/L (ref 96–106)
CO2 SERPL-SCNC: 22 MMOL/L (ref 20–29)
CREAT SERPL-MCNC: 0.98 MG/DL (ref 0.76–1.27)
EGFRCR SERPLBLD CKD-EPI 2021: 80 ML/MIN/1.73
GLOBULIN SER CALC-MCNC: 2.1 G/DL (ref 1.5–4.5)
GLUCOSE SERPL-MCNC: 87 MG/DL (ref 70–99)
HBA1C MFR BLD: 5.8 % (ref 4.8–5.6)
POTASSIUM SERPL-SCNC: 4.5 MMOL/L (ref 3.5–5.2)
PROT SERPL-MCNC: 6.4 G/DL (ref 6–8.5)
SODIUM SERPL-SCNC: 141 MMOL/L (ref 134–144)

## 2023-07-13 ENCOUNTER — TELEPHONE (OUTPATIENT)
Dept: FAMILY MEDICINE CLINIC | Facility: CLINIC | Age: 78
End: 2023-07-13

## 2023-07-13 NOTE — TELEPHONE ENCOUNTER
PHARMACY CALLED AND STATES THEY DO NOT DO COMPOUND PRESCRIPTIONS      Ibuprofen 3 %, Gabapentin 10 %, Baclofen 2 %, lidocaine 4 %     [facility] [address] [facility] ([facility] [place] - [address] [facility] [place] [facility] [phone] [facility] [phone]  [phone]

## 2023-07-14 ENCOUNTER — TELEPHONE (OUTPATIENT)
Dept: FAMILY MEDICINE CLINIC | Facility: CLINIC | Age: 78
End: 2023-07-14

## 2023-07-14 NOTE — TELEPHONE ENCOUNTER
Pharmacy Name: [facility] - [place], [place] - [address] - [phone]  - [phone]      Pharmacy representative name: ANUSHA    Pharmacy representative phone number: [phone]     What medication are you calling in regards to:     Ibuprofen 3 %, Gabapentin 10 %, Baclofen 2 %, lidocaine 4 %       What question does the pharmacy have: KETAMINE LISTED IN NOTE TO PHARMACY BUT NOT LISTED IN THE ACTUAL PRESCRIPTION    Who is the provider that prescribed the medication: TERERNCE    Additional notes: PLEASE CALL PHARMACY TO CLARIFY

## 2023-07-24 ENCOUNTER — HOSPITAL ENCOUNTER (OUTPATIENT)
Dept: CARDIOLOGY | Facility: HOSPITAL | Age: 78
Discharge: HOME | End: 2023-07-24
Admitting: STUDENT IN AN ORGANIZED HEALTH CARE EDUCATION/TRAINING PROGRAM
Payer: MEDICARE

## 2023-07-24 DIAGNOSIS — I73.9 PAD (PERIPHERAL ARTERY DISEASE): ICD-10-CM

## 2023-07-24 LAB
BH CV LOWER ARTERIAL LEFT 2ND DIGIT SYS MAX: 184
BH CV LOWER ARTERIAL LEFT 3RD DIGIT SYS MAX: 181
BH CV LOWER ARTERIAL LEFT 4TH DIGIT SYS MAX: 129
BH CV LOWER ARTERIAL LEFT 5TH DIGIT SYS MAX: 128
BH CV LOWER ARTERIAL LEFT ABI RATIO: 1.21
BH CV LOWER ARTERIAL LEFT DORSALIS PEDIS SYS MAX: 166
BH CV LOWER ARTERIAL LEFT GREAT TOE SYS MAX: 105
BH CV LOWER ARTERIAL LEFT POST TIBIAL SYS MAX: 168
BH CV LOWER ARTERIAL LEFT TBI RATIO: 0.76
BH CV LOWER ARTERIAL RIGHT 2ND DIGIT SYS MAX: NORMAL
BH CV LOWER ARTERIAL RIGHT 3RD DIGIT SYS MAX: NORMAL
BH CV LOWER ARTERIAL RIGHT 4TH DIGIT SYS MAX: 125
BH CV LOWER ARTERIAL RIGHT 5TH DIGIT SYS MAX: 189
BH CV LOWER ARTERIAL RIGHT ABI RATIO: 1.22
BH CV LOWER ARTERIAL RIGHT DORSALIS PEDIS SYS MAX: 156
BH CV LOWER ARTERIAL RIGHT GREAT TOE SYS MAX: 126
BH CV LOWER ARTERIAL RIGHT POST TIBIAL SYS MAX: 170
BH CV LOWER ARTERIAL RIGHT TBI RATIO: 0.91
UPPER ARTERIAL LEFT ARM BRACHIAL SYS MAX: 138 MMHG
UPPER ARTERIAL RIGHT ARM BRACHIAL SYS MAX: 139 MMHG

## 2023-07-24 PROCEDURE — 93922 UPR/L XTREMITY ART 2 LEVELS: CPT

## 2023-07-28 ENCOUNTER — OFFICE VISIT (OUTPATIENT)
Dept: FAMILY MEDICINE CLINIC | Facility: CLINIC | Age: 78
End: 2023-07-28
Payer: MEDICARE

## 2023-07-28 VITALS
SYSTOLIC BLOOD PRESSURE: 142 MMHG | TEMPERATURE: 98.2 F | OXYGEN SATURATION: 96 % | WEIGHT: 237 LBS | HEART RATE: 73 BPM | DIASTOLIC BLOOD PRESSURE: 86 MMHG | BODY MASS INDEX: 34.98 KG/M2

## 2023-07-28 DIAGNOSIS — M54.50 ACUTE RIGHT-SIDED LOW BACK PAIN WITHOUT SCIATICA: ICD-10-CM

## 2023-07-28 DIAGNOSIS — M80.00XD AGE-RELATED OSTEOPOROSIS WITH CURRENT PATHOLOGICAL FRACTURE WITH ROUTINE HEALING, SUBSEQUENT ENCOUNTER: ICD-10-CM

## 2023-07-28 DIAGNOSIS — M25.551 RIGHT HIP PAIN: ICD-10-CM

## 2023-07-28 DIAGNOSIS — S32.010D COMPRESSION FRACTURE OF L1 VERTEBRA WITH ROUTINE HEALING, SUBSEQUENT ENCOUNTER: Primary | ICD-10-CM

## 2023-07-28 RX ORDER — TRAMADOL HYDROCHLORIDE 50 MG/1
50 TABLET ORAL EVERY 6 HOURS PRN
Qty: 90 TABLET | Refills: 0 | Status: SHIPPED | OUTPATIENT
Start: 2023-07-28

## 2023-07-28 NOTE — PROGRESS NOTES
"Chief Complaint  Spine Injury    Subjective        Summer Condon Jr. presents to [facility]  History of Present Illness    Pain has gotten worse. If he is walking ok. He says when he sits for too long he has pain. Is taking two tramadol per day. When he takes the medication it does help the pain.   Saw vein specialists on Wednesday and said everything was good.     Objective   Vital Signs:  /86 (BP Location: Right arm, Patient Position: Sitting, Cuff Size: Large Adult)   Pulse 73   Temp 98.2 °F (36.8 °C)   Wt 108 kg (237 lb)   SpO2 96%   BMI 34.98 kg/m²   Estimated body mass index is 34.98 kg/m² as calculated from the following:    Height as of 12/30/22: 175.3 cm (69.02\").    Weight as of this encounter: 108 kg (237 lb).               Physical Exam  Vitals and nursing note reviewed.   Constitutional:       General: He is not in acute distress.     Appearance: Normal appearance. He is not ill-appearing.   HENT:      Head: Normocephalic and atraumatic.      Nose: Nose normal.      Mouth/Throat:      Mouth: Mucous membranes are moist.   Eyes:      Extraocular Movements: Extraocular movements intact.      Conjunctiva/sclera: Conjunctivae normal.   Cardiovascular:      Rate and Rhythm: Normal rate.   Pulmonary:      Effort: Pulmonary effort is normal.   Skin:     General: Skin is warm and dry.   Neurological:      General: No focal deficit present.      Mental Status: He is alert and oriented to person, place, and time. Mental status is at baseline.   Psychiatric:         Mood and Affect: Mood normal.         Behavior: Behavior normal.      Result Review :                   Assessment and Plan   Diagnoses and all orders for this visit:    1. Compression fracture of L1 vertebra with routine healing, subsequent encounter (Primary)  -     MRI Lumbar Spine Without Contrast; Future  -     Ambulatory Referral to Spine Surgery  -     traMADol (ULTRAM) 50 MG tablet; Take 1 tablet by " mouth Every 6 (Six) Hours As Needed for Moderate Pain.  Dispense: 90 tablet; Refill: 0    2. Age-related osteoporosis with current pathological fracture with routine healing, subsequent encounter  -     MRI Lumbar Spine Without Contrast; Future  -     Ambulatory Referral to Spine Surgery  -     traMADol (ULTRAM) 50 MG tablet; Take 1 tablet by mouth Every 6 (Six) Hours As Needed for Moderate Pain.  Dispense: 90 tablet; Refill: 0    3. Right hip pain  -     traMADol (ULTRAM) 50 MG tablet; Take 1 tablet by mouth Every 6 (Six) Hours As Needed for Moderate Pain.  Dispense: 90 tablet; Refill: 0    4. Acute right-sided low back pain without sciatica  -     traMADol (ULTRAM) 50 MG tablet; Take 1 tablet by mouth Every 6 (Six) Hours As Needed for Moderate Pain.  Dispense: 90 tablet; Refill: 0             Follow Up   Return in about 3 months (around 10/28/2023).  Patient was given instructions and counseling regarding his condition or for health maintenance advice. Please see specific information pulled into the AVS if appropriate.

## 2023-08-14 DIAGNOSIS — E11.9 TYPE 2 DIABETES MELLITUS WITHOUT COMPLICATION, WITHOUT LONG-TERM CURRENT USE OF INSULIN: ICD-10-CM

## 2023-08-16 DIAGNOSIS — I10 ESSENTIAL HYPERTENSION: ICD-10-CM

## 2023-08-16 DIAGNOSIS — E11.9 TYPE 2 DIABETES MELLITUS WITHOUT COMPLICATION, WITHOUT LONG-TERM CURRENT USE OF INSULIN: ICD-10-CM

## 2023-08-16 RX ORDER — LISINOPRIL 10 MG/1
TABLET ORAL
Qty: 90 TABLET | Refills: 0 | Status: SHIPPED | OUTPATIENT
Start: 2023-08-16

## 2023-08-21 ENCOUNTER — HOSPITAL ENCOUNTER (OUTPATIENT)
Dept: MRI IMAGING | Facility: HOSPITAL | Age: 78
Discharge: HOME | End: 2023-08-21
Admitting: STUDENT IN AN ORGANIZED HEALTH CARE EDUCATION/TRAINING PROGRAM
Payer: MEDICARE

## 2023-08-21 DIAGNOSIS — M80.00XD AGE-RELATED OSTEOPOROSIS WITH CURRENT PATHOLOGICAL FRACTURE WITH ROUTINE HEALING, SUBSEQUENT ENCOUNTER: ICD-10-CM

## 2023-08-21 DIAGNOSIS — S32.010D COMPRESSION FRACTURE OF L1 VERTEBRA WITH ROUTINE HEALING, SUBSEQUENT ENCOUNTER: ICD-10-CM

## 2023-08-21 PROCEDURE — 72148 MRI LUMBAR SPINE W/O DYE: CPT

## 2023-09-06 ENCOUNTER — OFFICE VISIT (OUTPATIENT)
Dept: FAMILY MEDICINE CLINIC | Facility: CLINIC | Age: 78
End: 2023-09-06
Payer: MEDICARE

## 2023-09-06 VITALS
TEMPERATURE: 97.5 F | WEIGHT: 234 LBS | OXYGEN SATURATION: 97 % | HEART RATE: 75 BPM | DIASTOLIC BLOOD PRESSURE: 80 MMHG | SYSTOLIC BLOOD PRESSURE: 146 MMHG | BODY MASS INDEX: 34.54 KG/M2

## 2023-09-06 DIAGNOSIS — M54.17 LUMBOSACRAL RADICULOPATHY AT L5: ICD-10-CM

## 2023-09-06 DIAGNOSIS — M16.11 ARTHRITIS OF RIGHT HIP: ICD-10-CM

## 2023-09-06 DIAGNOSIS — M79.604 RIGHT LEG PAIN: Primary | ICD-10-CM

## 2023-09-06 DIAGNOSIS — M43.10 SPONDYLOLISTHESIS, UNSPECIFIED SPINAL REGION: ICD-10-CM

## 2023-09-06 DIAGNOSIS — M54.9 BACK PAIN, UNSPECIFIED BACK LOCATION, UNSPECIFIED BACK PAIN LATERALITY, UNSPECIFIED CHRONICITY: ICD-10-CM

## 2023-09-06 DIAGNOSIS — G25.0 ESSENTIAL TREMOR: ICD-10-CM

## 2023-09-06 DIAGNOSIS — S32.010D CLOSED COMPRESSION FRACTURE OF L1 LUMBAR VERTEBRA WITH ROUTINE HEALING, SUBSEQUENT ENCOUNTER: ICD-10-CM

## 2023-09-06 RX ORDER — GABAPENTIN 100 MG/1
100 CAPSULE ORAL 3 TIMES DAILY
Qty: 90 CAPSULE | Refills: 0 | Status: SHIPPED | OUTPATIENT
Start: 2023-09-06

## 2023-09-06 NOTE — PROGRESS NOTES
"Chief Complaint  compression fracture    Subjective        Summer Condon Jr. presents to [facility]  History of Present Illness    Having increased pain in his back, around to his right hip, and down his right leg to his ankle. Has an appointment scheduled with surgeon on 9/23/23.   His biggest issue is getting up in the morning.   Tramadol does help with his pain.   Legs are getting tired quickly with walking.  Pain in the bottom of his leg feels like a numbing pain.     Now has a tremor in his left hand. Seems to be more with movement or with holding something.     Objective   Vital Signs:  /80 (BP Location: Right arm, Patient Position: Sitting, Cuff Size: Large Adult)   Pulse 75   Temp 97.5 °F (36.4 °C)   Wt 106 kg (234 lb)   SpO2 97%   BMI 34.54 kg/m²   Estimated body mass index is 34.54 kg/m² as calculated from the following:    Height as of 12/30/22: 175.3 cm (69.02\").    Weight as of this encounter: 106 kg (234 lb).           Physical Exam  Vitals and nursing note reviewed.   Constitutional:       General: He is not in acute distress.     Appearance: Normal appearance. He is not ill-appearing.   HENT:      Head: Normocephalic and atraumatic.      Nose: Nose normal.      Mouth/Throat:      Mouth: Mucous membranes are moist.   Eyes:      Extraocular Movements: Extraocular movements intact.      Conjunctiva/sclera: Conjunctivae normal.   Cardiovascular:      Rate and Rhythm: Normal rate and regular rhythm.      Heart sounds: Normal heart sounds. No murmur heard.    No gallop.   Pulmonary:      Effort: Pulmonary effort is normal. No respiratory distress.      Breath sounds: Normal breath sounds. No stridor. No wheezing, rhonchi or rales.   Chest:      Chest wall: No tenderness.   Skin:     General: Skin is warm and dry.   Neurological:      General: No focal deficit present.      Mental Status: He is alert and oriented to person, place, and time. Mental status is at " baseline.   Psychiatric:         Mood and Affect: Mood normal.         Behavior: Behavior normal.      Result Review :                   Assessment and Plan   Diagnoses and all orders for this visit:    1. Right leg pain (Primary)  -     Ambulatory Referral to Physical Therapy    2. Back pain, unspecified back location, unspecified back pain laterality, unspecified chronicity  -     Ambulatory Referral to Physical Therapy    3. Closed compression fracture of L1 lumbar vertebra with routine healing, subsequent encounter  -     Ambulatory Referral to Physical Therapy    4. Spondylolisthesis, unspecified spinal region  -     Ambulatory Referral to Physical Therapy    5. Lumbosacral radiculopathy at L5  -     gabapentin (NEURONTIN) 100 MG capsule; Take 1 capsule by mouth 3 (Three) Times a Day.  Dispense: 90 capsule; Refill: 0  -     Ambulatory Referral to Physical Therapy    6. Arthritis of right hip    7. Essential tremor      Will hold on treating tremor at this time.          Follow Up   No follow-ups on file.  Patient was given instructions and counseling regarding his condition or for health maintenance advice. Please see specific information pulled into the AVS if appropriate.

## 2023-10-02 NOTE — PROGRESS NOTES
Subjective   History of Present Illness: Summer Condon Jr. is a 77 y.o. male is being seen for consultation today at the request of Aundrea Snell DO for lower back pain and right lower extremity pain.  MRI 8/21/23.  He reports that in May he developed severe lower back pain and right lower extremity pain.  He reports that the pain is mostly in the right buttock region and right lower extremity near his ankle.  He has difficulty describing the symptoms, but reports that it was interfering with his ability to walk and stand.  He has been in outpatient physical therapy and taken oral steroids.  He reports he has had significant improvement and reports improvement with the severity and frequency of his pain.  He denies any changes in strength or sensation.    History of Present Illness    The following portions of the patient's history were reviewed and updated as appropriate: allergies, current medications, past family history, past medical history, past social history, past surgical history, and problem list.    Past Medical History:   Diagnosis Date    Abdominal pain of multiple sites     Abdominal pain, RUQ (right upper quadrant)     Acute sinusitis     Allergic rhinitis     Arthritis     Benign essential hypertension     BMI 35.0-35.9,adult     Cholelithiasis     Condyloma acuminata     Cough     Elevated cholesterol     Encounter for postoperative wound check     Flu-like symptoms     Former smoker     smokes 1 to 2 packs per day for 10 years    GERD (gastroesophageal reflux disease)     GI bleed     Hemorrhoids     High blood pressure     High cholesterol     History of allergy     History of cataract     History of colon polyps     History of long-term treatment with high-risk medication     Hyperglycemia     Hyperlipidemia     Hypertension     Influenza A     Internal hemorrhoids with complication     Morbidly obese     Myalgia     Myositis     Nasal congestion     No post-op complications     Prediabetes      Pruritus ani     Skin lesion     Sore throat         Past Surgical History:   Procedure Laterality Date    CATARACT EXTRACTION      CHOLECYSTECTOMY      COLONOSCOPY  11/17/2014    Reid Yousif MD    COLONOSCOPY N/A 10/26/2018    Procedure: COLONOSCOPY TO CECUM AND INTO TERMINAL ILEUM;  Surgeon: Reid Yousif MD;  Location: [facility] ENDOSCOPY;  Service: Gastroenterology    ELBOW ARTHROPLASTY            Current Outpatient Medications:     gabapentin (NEURONTIN) 100 MG capsule, Take 1 capsule by mouth 3 (Three) Times a Day., Disp: 90 capsule, Rfl: 0    metFORMIN (GLUCOPHAGE) 1000 MG tablet, TAKE 1 TABLET 2 (TWO) TIMES A DAY WITH MEALS., Disp: 180 tablet, Rfl: 0    Accu-Chek Guide test strip, To test blood sugar daily.        DX: E11.9, Disp: 100 each, Rfl: 11    baclofen (LIORESAL) 10 MG tablet, Take 1 tablet by mouth 3 (Three) Times a Day., Disp: 30 tablet, Rfl: 0    Ibuprofen 3 %, Gabapentin 10 %, Baclofen 2 %, lidocaine 4 %, Apply 1-2 g topically to the appropriate area as directed 3 (Three) to 4 (Four) times daily., Disp: 90 g, Rfl: 0    Lancets 28G misc, 1 Units Daily., Disp: 100 each, Rfl: 6    lisinopril (PRINIVIL,ZESTRIL) 10 MG tablet, TAKE 1 TABLET EVERY DAY, Disp: 90 tablet, Rfl: 0    meloxicam (MOBIC) 7.5 MG tablet, Take 2 tablets by mouth Daily., Disp: 180 tablet, Rfl: 0    rosuvastatin (CRESTOR) 5 MG tablet, Take 1 tablet by mouth every night at bedtime., Disp: 90 tablet, Rfl: 6    traMADol (ULTRAM) 50 MG tablet, Take 1 tablet by mouth Every 6 (Six) Hours As Needed for Moderate Pain. (Patient not taking: Reported on 10/4/2023), Disp: 90 tablet, Rfl: 0     Allergies   Allergen Reactions    Ibuprofen Rash    Lipitor [Atorvastatin] Unknown (See Comments)     cramps    Zocor [Simvastatin] Myalgia        Social History     Socioeconomic History    Marital status:    Tobacco Use    Smoking status: Former     Packs/day: 1.00     Years: 5.00     Pack years: 5.00     Types: Cigarettes     Start date:  "     Quit date:      Years since quittin.7    Smokeless tobacco: Never    Tobacco comments:     smokes 1 to 2 packs per day for 10 years   Substance and Sexual Activity    Alcohol use: Yes     Alcohol/week: 1.0 standard drink     Types: 1 Cans of beer per week     Comment: drinks 7 or less drinks per week    Drug use: No    Sexual activity: Not Currently     Partners: Female        Family History   Problem Relation Age of Onset    Arthritis Father     No Known Problems Other         Review of Systems    Objective     Vitals:    10/04/23 1302   BP: 124/82   Pulse: 73   Temp: 98 °F (36.7 °C)   SpO2: 98%   Weight: 107 kg (236 lb 12.8 oz)   Height: 175.3 cm (69\")     Body mass index is 34.97 kg/m².      Physical Exam  Neurologic Exam  Awake, alert, oriented x3  Pupils equal round reactive to light  Extraocular muscles intact  Face symmetric  Speech is fluent and clear  No pronator drift  Motor exam  Bilateral deltoids 5/5, bilateral biceps 5/5, bilateral triceps 5/5, bilateral wrist extension 5/5 bilateral hand  5/5  Bilateral hip flexion 5/5, bilateral knee extension 5/5, bilateral DF/PF 5/5  Steady normal gait  Able to detect  light touch in all 4 extremities          Assessment & Plan   Independent Review of Radiographic Studies:      I personally reviewed the images from the following studies.  MRI of the lumbar spine from 2023  The MRI images were reviewed and there is a 11 mm x 13 mm cystic lesion adjacent to the right L4-5 facet joint most likely representing a synovial cyst.  The cyst appears to compress the exiting right L4 and traversing L5 nerve root.    Medical Decision Makin-year-old male with a 4-month history of severe lower back pain and right lower extremity pain  -The MRI shows an 11 mm x 13 mm synovial cyst at the L4-5 level on the right.  He reports that his pain has significantly improved with physical therapy and oral steroids.  We discussed the risks and " benefits of a right-sided L4-5 hemilaminotomy and resection of the synovial cyst.  Since his symptoms are improving he would like to hold off for now.  I will plan to see him back in 3 months with an x-ray with flexion and extension views.  He was noted to have a slight grade 1 spondylolisthesis at L4-5 with significant bilateral facet edema.    Diagnoses and all orders for this visit:    1. Back pain with right-sided radiculopathy (Primary)  -     XR Spine Lumbar Complete With Flex & Ext; Future    2. Synovial cyst  -     XR Spine Lumbar Complete With Flex & Ext; Future      Return in about 3 months (around 1/4/2024).  I spent 45 minutes reviewing the medical record, reviewing the MRI images, discussing the management of back and lower extremity pain, discussing the risks and benefits of the laminotomy for resection of a synovial cyst.

## 2023-10-04 ENCOUNTER — OFFICE VISIT (OUTPATIENT)
Dept: NEUROSURGERY | Facility: CLINIC | Age: 78
End: 2023-10-04
Payer: MEDICARE

## 2023-10-04 VITALS
HEART RATE: 73 BPM | SYSTOLIC BLOOD PRESSURE: 124 MMHG | BODY MASS INDEX: 35.07 KG/M2 | OXYGEN SATURATION: 98 % | DIASTOLIC BLOOD PRESSURE: 82 MMHG | WEIGHT: 236.8 LBS | HEIGHT: 69 IN | TEMPERATURE: 98 F

## 2023-10-04 DIAGNOSIS — M71.30 SYNOVIAL CYST: ICD-10-CM

## 2023-10-04 DIAGNOSIS — M54.10 BACK PAIN WITH RIGHT-SIDED RADICULOPATHY: Primary | ICD-10-CM

## 2023-10-04 PROCEDURE — 1160F RVW MEDS BY RX/DR IN RCRD: CPT | Performed by: NEUROLOGICAL SURGERY

## 2023-10-04 PROCEDURE — 99204 OFFICE O/P NEW MOD 45 MIN: CPT | Performed by: NEUROLOGICAL SURGERY

## 2023-10-04 PROCEDURE — 3079F DIAST BP 80-89 MM HG: CPT | Performed by: NEUROLOGICAL SURGERY

## 2023-10-04 PROCEDURE — 3074F SYST BP LT 130 MM HG: CPT | Performed by: NEUROLOGICAL SURGERY

## 2023-10-04 PROCEDURE — 1159F MED LIST DOCD IN RCRD: CPT | Performed by: NEUROLOGICAL SURGERY

## 2023-10-30 DIAGNOSIS — M19.90 ARTHRITIS: ICD-10-CM

## 2023-10-30 RX ORDER — MELOXICAM 7.5 MG/1
15 TABLET ORAL DAILY
Qty: 180 TABLET | Refills: 1 | Status: SHIPPED | OUTPATIENT
Start: 2023-10-30

## 2023-11-14 ENCOUNTER — OFFICE VISIT (OUTPATIENT)
Dept: FAMILY MEDICINE CLINIC | Facility: CLINIC | Age: 78
End: 2023-11-14
Payer: MEDICARE

## 2023-11-14 ENCOUNTER — TELEPHONE (OUTPATIENT)
Dept: FAMILY MEDICINE CLINIC | Facility: CLINIC | Age: 78
End: 2023-11-14

## 2023-11-14 VITALS
DIASTOLIC BLOOD PRESSURE: 84 MMHG | WEIGHT: 240 LBS | SYSTOLIC BLOOD PRESSURE: 130 MMHG | HEART RATE: 66 BPM | BODY MASS INDEX: 35.44 KG/M2 | OXYGEN SATURATION: 94 % | TEMPERATURE: 97.1 F

## 2023-11-14 DIAGNOSIS — E66.01 MORBIDLY OBESE: ICD-10-CM

## 2023-11-14 DIAGNOSIS — E78.2 MIXED HYPERLIPIDEMIA: ICD-10-CM

## 2023-11-14 DIAGNOSIS — I10 ESSENTIAL HYPERTENSION: Primary | ICD-10-CM

## 2023-11-14 DIAGNOSIS — E11.9 TYPE 2 DIABETES MELLITUS WITHOUT COMPLICATION, UNSPECIFIED WHETHER LONG TERM INSULIN USE: ICD-10-CM

## 2023-11-14 PROBLEM — M20.40 HAMMER TOE: Status: ACTIVE | Noted: 2022-12-30

## 2023-11-14 NOTE — PROGRESS NOTES
"Chief Complaint  Hypertension    Subjective        Summer Condon Jr. presents to [facility]  History of Present Illness    Feeling some better today.   Having a back problem. Got MRI and then saw neurosurgery Dr. Reyes.     From neurosurgery note, \"The MRI shows an 11 mm x 13 mm synovial cyst at the L4-5 level on the right.  He reports that his pain has significantly improved with physical therapy and oral steroids.  We discussed the risks and benefits of a right-sided L4-5 hemilaminotomy and resection of the synovial cyst.  Since his symptoms are improving he would like to hold off for now.  I will plan to see him back in 3 months with an x-ray with flexion and extension views.  He was noted to have a slight grade 1 spondylolisthesis at L4-5 with significant bilateral facet edema.\"    Does feel physical therapy helped significantly. Pain in his leg has been gone for about 2 months.     Objective   Vital Signs:  /84 (BP Location: Left arm, Patient Position: Sitting, Cuff Size: Large Adult)   Pulse 66   Temp 97.1 °F (36.2 °C)   Wt 109 kg (240 lb)   SpO2 94%   BMI 35.44 kg/m²   Estimated body mass index is 35.44 kg/m² as calculated from the following:    Height as of 10/4/23: 175.3 cm (69\").    Weight as of this encounter: 109 kg (240 lb).               Physical Exam  Vitals and nursing note reviewed.   Constitutional:       General: He is not in acute distress.     Appearance: Normal appearance. He is not ill-appearing.   HENT:      Head: Normocephalic and atraumatic.      Nose: Nose normal.      Mouth/Throat:      Mouth: Mucous membranes are moist.   Eyes:      Extraocular Movements: Extraocular movements intact.      Conjunctiva/sclera: Conjunctivae normal.   Cardiovascular:      Rate and Rhythm: Normal rate and regular rhythm.      Heart sounds: Normal heart sounds. No murmur heard.     No gallop.   Pulmonary:      Effort: Pulmonary effort is normal. No respiratory " distress.      Breath sounds: Normal breath sounds. No stridor. No wheezing, rhonchi or rales.   Chest:      Chest wall: No tenderness.   Skin:     General: Skin is warm and dry.   Neurological:      General: No focal deficit present.      Mental Status: He is alert and oriented to person, place, and time. Mental status is at baseline.   Psychiatric:         Mood and Affect: Mood normal.         Behavior: Behavior normal.        Result Review :                   Assessment and Plan   Diagnoses and all orders for this visit:    1. Essential hypertension (Primary)  -     Comprehensive metabolic panel    2. Mixed hyperlipidemia  -     Lipid panel    3. Type 2 diabetes mellitus without complication, unspecified whether long term insulin use  -     MicroAlbumin, Urine, Random - Urine, Clean Catch  -     Hemoglobin A1c  -     Comprehensive metabolic panel    4. Morbidly obese    5. BMI 35.0-35.9,adult  Comments:  declined medication to help today. Will work on diet and exercise.      We talked about rybelsus to help w diabetes and weight. Plans to try and diet exercise to lose weight and will follow up in three months.        Follow Up   Return in about 3 months (around 2/14/2024) for Chronic care.  Patient was given instructions and counseling regarding his condition or for health maintenance advice. Please see specific information pulled into the AVS if appropriate.

## 2023-11-15 LAB
ALBUMIN SERPL-MCNC: 4.5 G/DL (ref 3.8–4.8)
ALBUMIN/GLOB SERPL: 1.6 {RATIO} (ref 1.2–2.2)
ALP SERPL-CCNC: 64 IU/L (ref 44–121)
ALT SERPL-CCNC: 16 IU/L (ref 0–44)
AST SERPL-CCNC: 18 IU/L (ref 0–40)
BILIRUB SERPL-MCNC: 1.2 MG/DL (ref 0–1.2)
BUN SERPL-MCNC: 14 MG/DL (ref 8–27)
BUN/CREAT SERPL: 15 (ref 10–24)
CALCIUM SERPL-MCNC: 9.1 MG/DL (ref 8.6–10.2)
CHLORIDE SERPL-SCNC: 102 MMOL/L (ref 96–106)
CHOLEST SERPL-MCNC: 168 MG/DL (ref 100–199)
CO2 SERPL-SCNC: 21 MMOL/L (ref 20–29)
CREAT SERPL-MCNC: 0.94 MG/DL (ref 0.76–1.27)
EGFRCR SERPLBLD CKD-EPI 2021: 84 ML/MIN/1.73
GLOBULIN SER CALC-MCNC: 2.9 G/DL (ref 1.5–4.5)
GLUCOSE SERPL-MCNC: NORMAL MG/DL
HBA1C MFR BLD: 6 % (ref 4.8–5.6)
HDLC SERPL-MCNC: 41 MG/DL
LDLC SERPL CALC-MCNC: 96 MG/DL (ref 0–99)
MICROALBUMIN UR-MCNC: 5.9 UG/ML
POTASSIUM SERPL-SCNC: NORMAL MMOL/L
PROT SERPL-MCNC: 7.4 G/DL (ref 6–8.5)
SODIUM SERPL-SCNC: 141 MMOL/L (ref 134–144)
TRIGL SERPL-MCNC: 177 MG/DL (ref 0–149)
VLDLC SERPL CALC-MCNC: 31 MG/DL (ref 5–40)

## 2023-12-13 ENCOUNTER — TELEPHONE (OUTPATIENT)
Dept: NEUROSURGERY | Facility: CLINIC | Age: 78
End: 2023-12-13
Payer: MEDICARE

## 2023-12-13 NOTE — TELEPHONE ENCOUNTER
Spoke with patient and advised that Dr. Reyes does not have anything until his appt on January 8th however if I get a cancellation I would be happy to call him. Pt voiced understanding.

## 2023-12-13 NOTE — TELEPHONE ENCOUNTER
Caller: Summer Condon Jr.    Relationship to patient: Self    Best call back number: [phone]     Patient is needing:   PATIENT STATES HIS BACK STARTED HURTING BAD THIS WEEKEND, BECAME UNBEARABLE ON 12/12.    PAIN IN BACK OF NECK, SHOOTS INTO HEAD LIKE A BAD H/A.   PAIN 9/10  'FEELS LIKE HIS HEAD IS GOING TO EXPLODE'    NO N/V  NO CONFUSION    WOULD LIKE TO BE SEEN SOONER TO DISCUSS OTHER OPTIONS, INCLUDING SURGICAL REMOVAL OF THE CYST, AS DISCUSSED IN LAST APPT.    STATES IF HE CAN'T GET IN TO THE OFFICE TODAY, HE MAY HAVE TO GO ED.    PLEASE CALL TO ADVISE

## 2023-12-13 NOTE — TELEPHONE ENCOUNTER
PATIENT CALLED BACK IN ASKING FOR SOONER APPOINTMENT; LET HIM KNOW HE WAS ADVISED TO VISIT ER SINCE WE CAN'T GET HIM IN SOONER... HE WAS UPSET AND HUNG UP

## 2023-12-13 NOTE — TELEPHONE ENCOUNTER
Spoke with patient and advised him that Dr. Reyes did not have any availability today nor this week or next and that he should go to the ER. Patient voiced understanding.

## 2023-12-14 ENCOUNTER — HOSPITAL ENCOUNTER (EMERGENCY)
Facility: HOSPITAL | Age: 78
Discharge: HOME | End: 2023-12-14
Attending: EMERGENCY MEDICINE
Payer: MEDICARE

## 2023-12-14 ENCOUNTER — TELEPHONE (OUTPATIENT)
Dept: NEUROSURGERY | Facility: CLINIC | Age: 78
End: 2023-12-14
Payer: MEDICARE

## 2023-12-14 ENCOUNTER — APPOINTMENT (OUTPATIENT)
Dept: CT IMAGING | Facility: HOSPITAL | Age: 78
End: 2023-12-14
Payer: MEDICARE

## 2023-12-14 VITALS
BODY MASS INDEX: 31.5 KG/M2 | DIASTOLIC BLOOD PRESSURE: 91 MMHG | HEIGHT: 70 IN | TEMPERATURE: 98.9 F | HEART RATE: 81 BPM | SYSTOLIC BLOOD PRESSURE: 164 MMHG | RESPIRATION RATE: 18 BRPM | WEIGHT: 220 LBS | OXYGEN SATURATION: 94 %

## 2023-12-14 DIAGNOSIS — M54.2 ACUTE NECK PAIN: Primary | ICD-10-CM

## 2023-12-14 DIAGNOSIS — M50.30 DEGENERATIVE DISC DISEASE, CERVICAL: ICD-10-CM

## 2023-12-14 LAB
ALBUMIN SERPL-MCNC: 4.8 G/DL (ref 3.5–5.2)
ALBUMIN/GLOB SERPL: 1.5 G/DL
ALP SERPL-CCNC: 76 U/L (ref 39–117)
ALT SERPL W P-5'-P-CCNC: 22 U/L (ref 1–41)
ANION GAP SERPL CALCULATED.3IONS-SCNC: 10 MMOL/L (ref 5–15)
AST SERPL-CCNC: 33 U/L (ref 1–40)
BASOPHILS # BLD AUTO: 0.05 10*3/MM3 (ref 0–0.2)
BASOPHILS NFR BLD AUTO: 0.7 % (ref 0–1.5)
BILIRUB SERPL-MCNC: 1.7 MG/DL (ref 0–1.2)
BUN SERPL-MCNC: 13 MG/DL (ref 8–23)
BUN/CREAT SERPL: 11.9 (ref 7–25)
CALCIUM SPEC-SCNC: 9.6 MG/DL (ref 8.6–10.5)
CHLORIDE SERPL-SCNC: 98 MMOL/L (ref 98–107)
CO2 SERPL-SCNC: 27 MMOL/L (ref 22–29)
CREAT SERPL-MCNC: 1.09 MG/DL (ref 0.76–1.27)
DEPRECATED RDW RBC AUTO: 39.1 FL (ref 37–54)
EGFRCR SERPLBLD CKD-EPI 2021: 70.3 ML/MIN/1.73
EOSINOPHIL # BLD AUTO: 0.05 10*3/MM3 (ref 0–0.4)
EOSINOPHIL NFR BLD AUTO: 0.7 % (ref 0.3–6.2)
ERYTHROCYTE [DISTWIDTH] IN BLOOD BY AUTOMATED COUNT: 12.2 % (ref 12.3–15.4)
GLOBULIN UR ELPH-MCNC: 3.2 GM/DL
GLUCOSE SERPL-MCNC: 117 MG/DL (ref 65–99)
HCT VFR BLD AUTO: 44.4 % (ref 37.5–51)
HGB BLD-MCNC: 15.2 G/DL (ref 13–17.7)
IMM GRANULOCYTES # BLD AUTO: 0.02 10*3/MM3 (ref 0–0.05)
IMM GRANULOCYTES NFR BLD AUTO: 0.3 % (ref 0–0.5)
LYMPHOCYTES # BLD AUTO: 1.66 10*3/MM3 (ref 0.7–3.1)
LYMPHOCYTES NFR BLD AUTO: 22.5 % (ref 19.6–45.3)
MCH RBC QN AUTO: 29.7 PG (ref 26.6–33)
MCHC RBC AUTO-ENTMCNC: 34.2 G/DL (ref 31.5–35.7)
MCV RBC AUTO: 86.7 FL (ref 79–97)
MONOCYTES # BLD AUTO: 0.7 10*3/MM3 (ref 0.1–0.9)
MONOCYTES NFR BLD AUTO: 9.5 % (ref 5–12)
NEUTROPHILS NFR BLD AUTO: 4.9 10*3/MM3 (ref 1.7–7)
NEUTROPHILS NFR BLD AUTO: 66.3 % (ref 42.7–76)
NRBC BLD AUTO-RTO: 0 /100 WBC (ref 0–0.2)
PLATELET # BLD AUTO: 251 10*3/MM3 (ref 140–450)
PMV BLD AUTO: 9.9 FL (ref 6–12)
POTASSIUM SERPL-SCNC: 4.5 MMOL/L (ref 3.5–5.2)
PROT SERPL-MCNC: 8 G/DL (ref 6–8.5)
RBC # BLD AUTO: 5.12 10*6/MM3 (ref 4.14–5.8)
SODIUM SERPL-SCNC: 135 MMOL/L (ref 136–145)
WBC NRBC COR # BLD AUTO: 7.38 10*3/MM3 (ref 3.4–10.8)

## 2023-12-14 PROCEDURE — 99284 EMERGENCY DEPT VISIT MOD MDM: CPT

## 2023-12-14 PROCEDURE — 96375 TX/PRO/DX INJ NEW DRUG ADDON: CPT

## 2023-12-14 PROCEDURE — 96374 THER/PROPH/DIAG INJ IV PUSH: CPT

## 2023-12-14 PROCEDURE — 25010000002 MORPHINE PER 10 MG: Performed by: NURSE PRACTITIONER

## 2023-12-14 PROCEDURE — 80053 COMPREHEN METABOLIC PANEL: CPT | Performed by: NURSE PRACTITIONER

## 2023-12-14 PROCEDURE — 25010000002 ONDANSETRON PER 1 MG: Performed by: NURSE PRACTITIONER

## 2023-12-14 PROCEDURE — 72125 CT NECK SPINE W/O DYE: CPT

## 2023-12-14 PROCEDURE — 85025 COMPLETE CBC W/AUTO DIFF WBC: CPT | Performed by: NURSE PRACTITIONER

## 2023-12-14 RX ORDER — MORPHINE SULFATE 2 MG/ML
4 INJECTION, SOLUTION INTRAMUSCULAR; INTRAVENOUS ONCE
Status: COMPLETED | OUTPATIENT
Start: 2023-12-14 | End: 2023-12-14

## 2023-12-14 RX ORDER — LIDOCAINE 4 G/G
1 PATCH TOPICAL ONCE
Status: DISCONTINUED | OUTPATIENT
Start: 2023-12-14 | End: 2023-12-14 | Stop reason: HOSPADM

## 2023-12-14 RX ORDER — LIDOCAINE 50 MG/G
1 PATCH TOPICAL EVERY 24 HOURS
Qty: 12 EACH | Refills: 0 | Status: SHIPPED | OUTPATIENT
Start: 2023-12-14

## 2023-12-14 RX ORDER — METHOCARBAMOL 750 MG/1
750 TABLET, FILM COATED ORAL ONCE
Status: COMPLETED | OUTPATIENT
Start: 2023-12-14 | End: 2023-12-14

## 2023-12-14 RX ORDER — ONDANSETRON 2 MG/ML
4 INJECTION INTRAMUSCULAR; INTRAVENOUS ONCE
Status: COMPLETED | OUTPATIENT
Start: 2023-12-14 | End: 2023-12-14

## 2023-12-14 RX ORDER — HYDROCODONE BITARTRATE AND ACETAMINOPHEN 5; 325 MG/1; MG/1
1 TABLET ORAL ONCE
Status: COMPLETED | OUTPATIENT
Start: 2023-12-14 | End: 2023-12-14

## 2023-12-14 RX ORDER — METHOCARBAMOL 750 MG/1
750 TABLET, FILM COATED ORAL 3 TIMES DAILY PRN
Qty: 16 TABLET | Refills: 0 | Status: SHIPPED | OUTPATIENT
Start: 2023-12-14

## 2023-12-14 RX ORDER — SODIUM CHLORIDE 0.9 % (FLUSH) 0.9 %
10 SYRINGE (ML) INJECTION AS NEEDED
Status: DISCONTINUED | OUTPATIENT
Start: 2023-12-14 | End: 2023-12-14 | Stop reason: HOSPADM

## 2023-12-14 RX ORDER — HYDROCODONE BITARTRATE AND ACETAMINOPHEN 5; 325 MG/1; MG/1
1 TABLET ORAL EVERY 4 HOURS PRN
Qty: 16 TABLET | Refills: 0 | Status: SHIPPED | OUTPATIENT
Start: 2023-12-14

## 2023-12-14 RX ADMIN — METHOCARBAMOL TABLETS 750 MG: 750 TABLET, COATED ORAL at 13:30

## 2023-12-14 RX ADMIN — HYDROCODONE BITARTRATE AND ACETAMINOPHEN 1 TABLET: 5; 325 TABLET ORAL at 15:52

## 2023-12-14 RX ADMIN — LIDOCAINE 1 PATCH: 4 PATCH TOPICAL at 13:28

## 2023-12-14 RX ADMIN — ONDANSETRON 4 MG: 2 INJECTION INTRAMUSCULAR; INTRAVENOUS at 13:30

## 2023-12-14 RX ADMIN — MORPHINE SULFATE 4 MG: 2 INJECTION, SOLUTION INTRAMUSCULAR; INTRAVENOUS at 13:30

## 2023-12-14 NOTE — ED PROVIDER NOTES
EMERGENCY DEPARTMENT ENCOUNTER    Room Number:    Date seen:  2023  PCP: Aundrea Snell DO  Historian/Independent historian: wife  Chronic or social conditions impacting care: age      HPI:  Chief Complaint: neck pain  A complete HPI/ROS/PMH/PSH/SH/FH are unobtainable due to: n/a  Context: Summer Condon Jr. is a 78 y.o. male who presents to the ED c/o neck pain that has been worsening since the weekend.  He denies any injury.  He states that the pain does sometimes shoot up into the back of his head.  It does not radiate into his arms.  No weakness, numbness, tingling of his upper extremities. His wife states that he is followed with Dr. Reyes for low back pain and they called his office and they recommend he come to the ER for further evaluation.     External Medical record review:   10/4/23:   Assessment & Plan  Independent Review of Radiographic Studies:      I personally reviewed the images from the following studies.  MRI of the lumbar spine from 2023  The MRI images were reviewed and there is a 11 mm x 13 mm cystic lesion adjacent to the right L4-5 facet joint most likely representing a synovial cyst.  The cyst appears to compress the exiting right L4 and traversing L5 nerve root.     Medical Decision Makin-year-old male with a 4-month history of severe lower back pain and right lower extremity pain  -The MRI shows an 11 mm x 13 mm synovial cyst at the L4-5 level on the right.  He reports that his pain has significantly improved with physical therapy and oral steroids.  We discussed the risks and benefits of a right-sided L4-5 hemilaminotomy and resection of the synovial cyst.  Since his symptoms are improving he would like to hold off for now.  I will plan to see him back in 3 months with an x-ray with flexion and extension views.  He was noted to have a slight grade 1 spondylolisthesis at L4-5 with significant bilateral facet edema.    PAST MEDICAL HISTORY  Active  Ambulatory Problems     Diagnosis Date Noted    Right upper quadrant abdominal pain 10/10/2017    Hemorrhage of anus and rectum 10/25/2018    GI bleed 10/25/2018    Morbidly obese 01/27/2020    Essential hypertension 01/27/2020    Hyperlipidemia 01/27/2020    Medicare annual wellness visit, subsequent 02/27/2020    Type 2 diabetes mellitus without complication, without long-term current use of insulin 02/27/2020    Muscle cramps 12/06/2021    High risk medication use 04/11/2022    Loss or death of partner 04/11/2022    Acquired hammer toe of left foot 12/30/2022    Hammer toe 12/30/2022    BMI 35.0-35.9,adult 11/14/2023     Resolved Ambulatory Problems     Diagnosis Date Noted    No Resolved Ambulatory Problems     Past Medical History:   Diagnosis Date    Abdominal pain of multiple sites     Abdominal pain, RUQ (right upper quadrant)     Acute sinusitis     Allergic rhinitis     Arthritis     Benign essential hypertension     Cholelithiasis     Condyloma acuminata     Cough     Elevated cholesterol     Encounter for postoperative wound check     Flu-like symptoms     Former smoker     GERD (gastroesophageal reflux disease)     Hemorrhoids     High blood pressure     High cholesterol     History of allergy     History of cataract     History of colon polyps     History of long-term treatment with high-risk medication     Hyperglycemia     Hypertension     Influenza A     Internal hemorrhoids with complication     Myalgia     Myositis     Nasal congestion     No post-op complications     Prediabetes     Pruritus ani     Skin lesion     Sore throat          PAST SURGICAL HISTORY  Past Surgical History:   Procedure Laterality Date    CATARACT EXTRACTION      CHOLECYSTECTOMY      COLONOSCOPY  11/17/2014    Reid Yousif MD    COLONOSCOPY N/A 10/26/2018    Procedure: COLONOSCOPY TO CECUM AND INTO TERMINAL ILEUM;  Surgeon: Reid Yousif MD;  Location: [facility] ENDOSCOPY;  Service: Gastroenterology    ELBOW ARTHROPLASTY            FAMILY HISTORY  Family History   Problem Relation Age of Onset    Arthritis Father     No Known Problems Other          SOCIAL HISTORY  Social History     Socioeconomic History    Marital status:    Tobacco Use    Smoking status: Former     Packs/day: 1.00     Years: 5.00     Additional pack years: 0.00     Total pack years: 5.00     Types: Cigarettes     Start date:      Quit date:      Years since quittin.9    Smokeless tobacco: Never    Tobacco comments:     smokes 1 to 2 packs per day for 10 years   Substance and Sexual Activity    Alcohol use: Yes     Alcohol/week: 1.0 standard drink of alcohol     Types: 1 Cans of beer per week     Comment: drinks 7 or less drinks per week    Drug use: No    Sexual activity: Not Currently     Partners: Female         ALLERGIES  Ibuprofen, Lipitor [atorvastatin], and Zocor [simvastatin]        REVIEW OF SYSTEMS  Per HPI, otherwise negative.       PHYSICAL EXAM  ED Triage Vitals   Temp Heart Rate Resp BP SpO2   23 1245 23 1245 23 1245 23 1249 23 1245   98.9 °F (37.2 °C) 81 18 (!) 157/101 97 %      Temp src Heart Rate Source Patient Position BP Location FiO2 (%)   -- -- 23 1249 -- --     Sitting         Physical Exam  Vitals and nursing note reviewed.   Constitutional:       Appearance: Normal appearance.   HENT:      Mouth/Throat:      Mouth: Mucous membranes are moist.   Eyes:      Conjunctiva/sclera: Conjunctivae normal.   Neck:      Comments: Bilateral neck ttp with reduced ROM secondary to pain. Bilateral trapezius tightening noted. No midline or point tenderness. Strength 5/5 and equal to BUE. Sensation intact to light touch   Cardiovascular:      Rate and Rhythm: Normal rate and regular rhythm.      Pulses: Normal pulses.      Heart sounds: Normal heart sounds.   Pulmonary:      Effort: Pulmonary effort is normal.      Breath sounds: No wheezing.   Musculoskeletal:      Cervical back: Tenderness present.       Comments:     No midline thoracic spine tenderness. No obvious step-off or deformities.     No lumbar spine tenderness. Negative straight-leg exam bilaterally. Strength 5/5 and equal to BLE. Sensation intact to light touch. FROM.     Neurological:      Mental Status: He is alert and oriented to person, place, and time. Mental status is at baseline.   Psychiatric:         Mood and Affect: Mood normal.               LAB RESULTS  Recent Results (from the past 24 hour(s))   Comprehensive Metabolic Panel    Collection Time: 12/14/23  1:27 PM    Specimen: Blood   Result Value Ref Range    Glucose 117 (H) 65 - 99 mg/dL    BUN 13 8 - 23 mg/dL    Creatinine 1.09 0.76 - 1.27 mg/dL    Sodium 135 (L) 136 - 145 mmol/L    Potassium 4.5 3.5 - 5.2 mmol/L    Chloride 98 98 - 107 mmol/L    CO2 27.0 22.0 - 29.0 mmol/L    Calcium 9.6 8.6 - 10.5 mg/dL    Total Protein 8.0 6.0 - 8.5 g/dL    Albumin 4.8 3.5 - 5.2 g/dL    ALT (SGPT) 22 1 - 41 U/L    AST (SGOT) 33 1 - 40 U/L    Alkaline Phosphatase 76 39 - 117 U/L    Total Bilirubin 1.7 (H) 0.0 - 1.2 mg/dL    Globulin 3.2 gm/dL    A/G Ratio 1.5 g/dL    BUN/Creatinine Ratio 11.9 7.0 - 25.0    Anion Gap 10.0 5.0 - 15.0 mmol/L    eGFR 70.3 >60.0 mL/min/1.73   CBC Auto Differential    Collection Time: 12/14/23  1:27 PM    Specimen: Blood   Result Value Ref Range    WBC 7.38 3.40 - 10.80 10*3/mm3    RBC 5.12 4.14 - 5.80 10*6/mm3    Hemoglobin 15.2 13.0 - 17.7 g/dL    Hematocrit 44.4 37.5 - 51.0 %    MCV 86.7 79.0 - 97.0 fL    MCH 29.7 26.6 - 33.0 pg    MCHC 34.2 31.5 - 35.7 g/dL    RDW 12.2 (L) 12.3 - 15.4 %    RDW-SD 39.1 37.0 - 54.0 fl    MPV 9.9 6.0 - 12.0 fL    Platelets 251 140 - 450 10*3/mm3    Neutrophil % 66.3 42.7 - 76.0 %    Lymphocyte % 22.5 19.6 - 45.3 %    Monocyte % 9.5 5.0 - 12.0 %    Eosinophil % 0.7 0.3 - 6.2 %    Basophil % 0.7 0.0 - 1.5 %    Immature Grans % 0.3 0.0 - 0.5 %    Neutrophils, Absolute 4.90 1.70 - 7.00 10*3/mm3    Lymphocytes, Absolute 1.66 0.70 - 3.10 10*3/mm3     Monocytes, Absolute 0.70 0.10 - 0.90 10*3/mm3    Eosinophils, Absolute 0.05 0.00 - 0.40 10*3/mm3    Basophils, Absolute 0.05 0.00 - 0.20 10*3/mm3    Immature Grans, Absolute 0.02 0.00 - 0.05 10*3/mm3    nRBC 0.0 0.0 - 0.2 /100 WBC       Ordered the above labs and reviewed the results.        RADIOLOGY  CT Cervical Spine Without Contrast    Result Date: 12/14/2023  CT CERVICAL SPINE WITHOUT CONTRAST  HISTORY: Neck pain.  COMPARISON: None.  FINDINGS: The alignment of the cervical spine is within normal limits. There is no evidence of prevertebral edema or fracture.  C2-3: Small central disc bulge is present.  C3-4: Small central disc bulge is present.  C4-5: A very small central disc bulge is present.  C5-6: A small to moderate-sized central disc osteophyte complex is present which abuts the ventral surface of the cord.  C6-7: There is no evidence of a disc bulge or herniation. Small accessory ribs are noted bilaterally at C7, more prominent on the right.  C7-T1: There is no evidence of disc bulge or herniation.      1.  Mild degenerative disease involving the cervical spine is noted as described above. There is no evidence of fracture or of disc herniation. Further evaluation could be performed with MRI examination of the cervical spine. 2.  Small accessory ribs are noted at C7 bilaterally, more prominent on the right. Clinical correlation suggested.  Radiation dose reduction techniques were utilized, including automated exposure control and exposure modulation based on body size.        Ordered the above noted radiological studies. Reviewed by me in PACS.        MEDICATIONS GIVEN IN ER  Medications   sodium chloride 0.9 % flush 10 mL (has no administration in time range)   Lidocaine 4 % 1 patch (1 patch Transdermal Medication Applied 12/14/23 1328)   HYDROcodone-acetaminophen (NORCO) 5-325 MG per tablet 1 tablet (has no administration in time range)   morphine injection 4 mg (4 mg Intravenous Given 12/14/23 1330)    ondansetron (ZOFRAN) injection 4 mg (4 mg Intravenous Given 12/14/23 1330)   methocarbamol (ROBAXIN) tablet 750 mg (750 mg Oral Given 12/14/23 1330)           MEDICAL DECISION MAKING, PROGRESS, and CONSULTS    All labs have been independently reviewed by me.  All radiology studies have been reviewed by me and I have also reviewed the radiology report.   EKG's independently viewed and interpreted by me.  Discussion below represents my analysis of pertinent findings related to patient's condition, differential diagnosis, treatment plan and final disposition.    78-year-old male who presents with neck pain and no known injury.  Imaging negative for acute abnormality, pain improved after analgesics.  Discussed plan for pain control with patient, wife, neurosurgery and all are in agreement that he will stop taking the baclofen and tramadol and will start [place] and Robaxin.  Neurosurgery will call patient tomorrow to attempt to schedule a sooner follow-up.          Shared decision making/consideration for admission: Stable for outpatient follow-up      Orders placed during this visit:  Orders Placed This Encounter   Procedures    CT Cervical Spine Without Contrast    Comprehensive Metabolic Panel    CBC Auto Differential    Neurosurgery (on-call MD unless specified)    Insert Peripheral IV    CBC & Differential         Differential diagnosis include, but not limited to: Cervical radiculopathy, compression fracture cervical strain, muscle cramp      Additional orders considered but not ordered: MRI cervical spine, steroids    Independent interpretation of labs, radiology studies, and discussions with consultants:    Discussed with Dr. Falcon, who agrees with plan.     ED Course as of 12/14/23 1548   Thu Dec 14, 2023   1440 Ambulating to CT scan without difficulty [JG]   1515 Updated patient and wife on ED workup including labs and imaging, pain significantly improved after analgesic intervention in the ER.  Will call  [facility] to attempt to get sooner follow up for patient as his appt is not until Jan 9th.  [JG]   1538 Discussed with [facility] for [facility], she agrees with plan of stopping tramadol and baclofen and doing short supply of Norco. She will have office call tomorrow to schedule a sooner follow up.     Patient and wife updated, all questions and concerns addressed. Stable and ready for discharge and follow up with [facility].  [JG]      ED Course User Index  [JG] Aria Desai APRN             DIAGNOSIS  Final diagnoses:   Acute neck pain   Degenerative disc disease, cervical         DISPOSITION  discharge      Latest Documented Vital Signs:  As of 15:48 EST  BP- 164/91 HR- 81 Temp- 98.9 °F (37.2 °C) O2 sat- 94%              --    Please note that portions of this were completed with a voice recognition program.       Note Disclaimer: At [facility], we believe that sharing information builds trust and better relationships. You are receiving this note because you are receiving care at [facility] or recently visited. It is possible you will see health information before a provider has talked with you about it. This kind of information can be easy to misunderstand. To help you fully understand what it means for your health, we urge you to discuss this note with your provider.             Aria Desai APRN  12/16/23 [id number]

## 2023-12-14 NOTE — TELEPHONE ENCOUNTER
PATIENT CALLED TO ADVISE HE IS GOING TO THE EMERGENCY ROOM. I ADVISED I WOULD NOTIFY DR WHITMAN AND HIS CLINICAL STAFF.

## 2023-12-14 NOTE — ED TRIAGE NOTES
Pt states that he has had chronic back pain for there last year. Was in PT for it and it improved. States that since Tuesday the pain has moved into his neck. No known injury.

## 2023-12-14 NOTE — DISCHARGE INSTRUCTIONS
Take methocarbamol instead of the baclofen- do not take both  Take hydrocodone instead of the tramadol- do not take both  Start taking your meloxicam again  Use lidocaine patches as prescribed  Apply heat to help with muscle tightening  Follow up with Dr. Reyes as discussed- they should be calling you tomorrow to schedule a sooner follow up    Return to ER for fever, severe pain, new numbness, tingling, or weakness, any new complaints or concerns

## 2023-12-14 NOTE — ED PROVIDER NOTES
MD ATTESTATION NOTE    The LEA and I have discussed this patient's history, physical exam, and treatment plan.  I have reviewed the documentation and personally had a face to face interaction with the patient. I affirm the documentation and agree with the treatment and plan.  The attached note describes my personal findings.      I provided a substantive portion of the care of the patient.  I personally performed the physical exam in its entirety, and below are my findings.     Brief HPI: Patient presents for evaluation of neck pain.  Patient has history of low back pain.  Has had a synovial cyst.  States neck pain is started now.  Pain with movement.  Has had no fevers or chills.    PHYSICAL EXAM  ED Triage Vitals   Temp Heart Rate Resp BP SpO2   12/14/23 1245 12/14/23 1245 12/14/23 1245 12/14/23 1249 12/14/23 1245   98.9 °F (37.2 °C) 81 18 (!) 157/101 97 %      Temp src Heart Rate Source Patient Position BP Location FiO2 (%)   -- -- 12/14/23 1249 -- --     Sitting           GENERAL: no acute distress  HENT: nares patent  EYES: no scleral icterus  CV: regular rhythm, normal rate  RESPIRATORY: normal effort  ABDOMEN: soft  MUSCULOSKELETAL: no deformity.  Tenderness to neck  NEURO: alert, moves all extremities, follows commands  PSYCH:  calm, cooperative  SKIN: warm, dry    Vital signs and nursing notes reviewed.        Plan: Imaging of C-spine     Seth Falcon MD  12/14/23 [id number]

## 2023-12-14 NOTE — ED NOTES
Patient to ER via car from home sees Dr Reyes for cyst on L4L5 and patient reports pain is worse today

## 2023-12-15 ENCOUNTER — PATIENT OUTREACH (OUTPATIENT)
Dept: CASE MANAGEMENT | Facility: OTHER | Age: 78
End: 2023-12-15
Payer: MEDICARE

## 2023-12-15 DIAGNOSIS — R10.11 RIGHT UPPER QUADRANT ABDOMINAL PAIN: ICD-10-CM

## 2023-12-15 DIAGNOSIS — I10 ESSENTIAL HYPERTENSION: Primary | ICD-10-CM

## 2023-12-15 DIAGNOSIS — E78.2 MIXED HYPERLIPIDEMIA: ICD-10-CM

## 2023-12-15 DIAGNOSIS — E11.9 TYPE 2 DIABETES MELLITUS WITHOUT COMPLICATION, WITHOUT LONG-TERM CURRENT USE OF INSULIN: ICD-10-CM

## 2023-12-15 NOTE — OUTREACH NOTE
AMBULATORY CASE MANAGEMENT NOTE    Name and Relationship of Patient/Support Person: Summer Condon Jr. - Self    Called and spoke to patient following ER visit and for CCM services. Introduced self and role. Discussed CCM benefits and goals. Reviewed possible cost and ability to stop program at any time. Verbal consent obtained. Provided patient with my direct contact info.    Patient went to ER 12/14 for sharp neck pain radiating to his head and he states he felt like his head was going to come off. He was DC'd and to follow up with Neurosurgery Dr. Reyes. BP reading during ER visit was elevated, possibly due to pain. Patient states he doesn't have BP issues and usually runs around 130 SBP whenever checked.    At the time of call, Neurosurgery haven't called patient yet to schedule f/u appointment. Advised patient that if he don't hear from them is to call the office so he can make an appointment. Patient has Neurosurgery office number and he states he will call this afternoon.     He continues to have lumbar pain. The sharp pain to his neck is gone for now. Patient have pain medications until he sees Dr Reyes. Patient verbalizes he's currently trying to lose weight, eating more salads lately and trying to be active but lately because of the pain he's not as active as he was wanting to be. He also has DM2 and watches is intake.     He states he does drink but only occasional with 1 beer when he plays golf. He recently got done w/ outpatient PT about 2 weeks ago.     Discussed with patient to continue with physical activity as tolerated, diet with less salt, more greens and lean meat protein. Patient verbalized understanding. Patient agreeable for AC to monitor and for future outreaches.      Adult Patient Profile  Questions/Answers      Flowsheet Row Most Recent Value   Symptoms/Conditions Managed at Home obesity, chronic pain, diabetes, type 2, cardiovascular   Barriers to Managing Health stress of chronic  illness, age   Cardiovascular Symptoms/Conditions high blood cholesterol, hypertension   Cardiovascular Management Strategies activity, coping strategies, diet modification, medication therapy, weight management   Cardiovascular Self-Management Outcome 3 (uncertain)   Cardiovascular Comment Patient trying to eat much healthier these days, has been trying to eat more salad.   Diabetes Management Strategies medication therapy, weight management, routine screenings   Diabetes Self-Management Outcome 3 (uncertain)   Diabetes Comment Current A1C 6.0   Chronic Pain Frequency frequent   Chronic Pain Quality aching   Chronic Pain Onset/Duration Chronic lumabr pain - MRI shows CYST, followed by Neurosurgery Dr Reyes. Went to ER 12/14 for sharp neck pain that radiates to his head.   Chronic Pain Associated Signs/Symptoms blood pressure change, weakness   Chronic Pain Management Strategies activity, coping strategies, routine screening, medication therapy, weight management   Chronic Pain Self-Management Outcome 3 (uncertain)   Q1: How often do you have a drink containing alcohol? Monthly or l   Q2: How many drinks containing alcohol do you have on a typical day when you are drinking? 1 or 2  [occasional beer when playing golf]   Q3: How often do you have six or more drinks on one occasion? Never   Audit-C Score 1            Education Documentation  unresolved or worsening symptoms, taught by Clark Conte, RN at 12/15/2023  2:29 PM.  Learner: Patient  Readiness: Acceptance  Method: Explanation  Response: Verbalizes Understanding    activity, taught by Clark Conte RN at 12/15/2023  2:29 PM.  Learner: Patient  Readiness: Acceptance  Method: Explanation  Response: Verbalizes Understanding    Unresolved/Worsening Symptoms, taught by Clark Conte RN at 12/15/2023  2:29 PM.  Learner: Patient  Readiness: Acceptance  Method: Explanation  Response: Verbalizes Understanding    Medication Management, taught by Dg  VERONICA Rodas at 12/15/2023  2:29 PM.  Learner: Patient  Readiness: Acceptance  Method: Explanation  Response: Verbalizes Understanding    activity, taught by Clark Conte RN at 12/15/2023  2:29 PM.  Learner: Patient  Readiness: Acceptance  Method: Explanation  Response: Verbalizes Understanding    food/fluid intake, taught by Clark Conte RN at 12/15/2023  2:29 PM.  Learner: Patient  Readiness: Acceptance  Method: Explanation  Response: Verbalizes Understanding          Clark GONSALVES  Ambulatory Case Management    12/15/2023, 14:30 EST

## 2023-12-21 ENCOUNTER — TELEPHONE (OUTPATIENT)
Dept: NEUROSURGERY | Facility: CLINIC | Age: 78
End: 2023-12-21
Payer: MEDICARE

## 2023-12-21 NOTE — TELEPHONE ENCOUNTER
I spoke to patient let him know Kaila would like him to have a cervical MRI prior to following up. Patient states he no longer has any neck pain after getting injection in the ER. He will follow up as scheduled with Dr. Reyes for lumbar pain with new XR.

## 2023-12-21 NOTE — TELEPHONE ENCOUNTER
----- Message from MINESH Hansen sent at 12/14/2023  3:59 PM EST -----  Regarding: ER f/u  Patient went to the ER today with neck pain.  No radiculopathy.  CT unremarkable.  He has an appointment January 9 but ER provider requested that  We try to see him sooner.  They gave him muscle relaxant and some pain medication.  Can you look to see if [facility] or see [facility]a sooner?  Thank

## 2023-12-28 ENCOUNTER — PATIENT OUTREACH (OUTPATIENT)
Dept: CASE MANAGEMENT | Facility: OTHER | Age: 78
End: 2023-12-28
Payer: MEDICARE

## 2023-12-28 DIAGNOSIS — E78.2 MIXED HYPERLIPIDEMIA: ICD-10-CM

## 2023-12-28 DIAGNOSIS — I10 ESSENTIAL HYPERTENSION: Primary | ICD-10-CM

## 2023-12-28 DIAGNOSIS — E11.9 TYPE 2 DIABETES MELLITUS WITHOUT COMPLICATION, WITHOUT LONG-TERM CURRENT USE OF INSULIN: ICD-10-CM

## 2023-12-28 NOTE — OUTREACH NOTE
[facility] End of Month Documentation    This Chronic Medical Management Care Plan for Summer Condon Jr., 78 y.o. male, has been a new plan of care implemented; established and a new plan of care implemented for the month of December.  A cumulative time of 57  minutes was spent on this patient record this month, including phone call with patient; chart review; electronic communication with primary care provider, F/U after ER visit. Pain management, medication, diet and lifestyle education. Reminded to follow up with providers especially with neurosurgery per ED AVS..    Regarding the patient's problems: has Right upper quadrant abdominal pain; Hemorrhage of anus and rectum; GI bleed; Morbidly obese; Essential hypertension; Hyperlipidemia; Medicare annual wellness visit, subsequent; Type 2 diabetes mellitus without complication, without long-term current use of insulin; Muscle cramps; High risk medication use; Loss or death of partner; Acquired hammer toe of left foot; Hammer toe; and BMI 35.0-35.9,adult on their problem list., the following items were addressed: medical records; medications and any changes can be found within the plan section of the note.  A detailed listing of time spent for chronic care management is tracked within each outreach encounter.  Current medications include:  has a current medication list which includes the following prescription(s): accu-chek guide, baclofen, diclofenac sodium, gabapentin, hydrocodone-acetaminophen, rx alternatives neuropathic pain, lancets 28g, lidocaine, lisinopril, meloxicam, metformin, methocarbamol, and rosuvastatin. and the patient is reported to be patient is compliant with medication protocol,  Medications are reported to be effective, Sharp pain to neck gone for now, but continues with pain to lower back..  Regarding these diagnoses, referrals were made to the following provider(s):  n/a.  All notes on chart for PCP to review.    The patient was monitored remotely  for blood pressure; blood glucose; activity level; weight; pain; medications.    The patient's physical needs include:  physical healthcare.     The patient's mental support needs include:  continued support    The patient's cognitive support needs include:  not applicable    The patient's psychosocial support needs include:  not applicable    The patient's functional needs include: physical healthcare    The patient's environmental needs include:  not applicable, n/a    Care Plan overall comments:  No data recorded    Refer to previous outreach notes for more information on the areas listed above.    Monthly Billing Diagnoses  (I10) Essential hypertension    (E11.9) Type 2 diabetes mellitus without complication, without long-term current use of insulin    (E78.2) Mixed hyperlipidemia    Medications   Medications have been reconciled    Care Plan progress this month:      Recently Modified Care Plans Updates made since 11/27/2023 12:00 AM       Chronic Pain (Adult)           Problem Priority Last Modified     Pain Management Plan (Chronic Pain) --  12/15/2023  2:19 PM by Clark Conte RN              Goal Recent Progress Last Modified     Pain Management Plan Developed --  12/15/2023  2:19 PM by Clark Conte RN     Evidence-based guidance:   Acknowledge patient as the expert in pain self-management.   Partner to set a comfort goal that allows for return to work, school, usual activity and acceptable quality of life.   Assess pain level, usual behavior with and without pain and symptoms that commonly occur with chronic pain, such as fatigue, sleep problems, cognitive and mood disturbance; use a consistent pain scale.   Determine if pain is associated with mobility or at rest, location, intensity, frequency, duration, recurrence, pattern, triggers, relieving factors and description, such as cramping, burning or aching.    Mutually develop an interdisciplinary, multi-modal and detailed pain management plan  "with patient and family or caregiver; track the patient's response to pain management and adjust plan as needed.    Notes:            Task Due Date Last Modified     Partner to Develop Chronic Pain Management Plan --  12/15/2023  2:22 PM by Clark Conte RN     Care Management Activities:      - pain assessed  - patient response to treatment assessed      Notes:                Problem Priority Last Modified     Chronic Pain Management (Chronic Pain) --  12/15/2023  2:19 PM by Clark Conte RN              Goal Recent Progress Last Modified     Chronic Pain Managed --  12/15/2023  2:19 PM by Clark Conte RN     Evidence-based guidance:   Address common beliefs about pain, such as pain is to be endured, a normal part of aging or that it is not \"real\"; feelings of resignation that nothing can be done and that complaining will be a sign of weakness.   Assess pain level, treatment efficacy and patient response at regular intervals using a consistent pain scale.   Assess pain using self-report (most reliable), family/caregiver report, validated pain scale; consider impact on quality of life.   Determine if pain is associated with mobility or at rest, location, intensity, frequency, duration, recurrence, pattern and description (e.g., cramping, burning, aching), triggers and relieving factors.    Anticipate referral to pain education program, pain management support or community resources for specific diagnoses (e.g., cancer, fibromyalgia, multiple sclerosis).   Explore fears associated with anticipated or imagined pain; encourage acceptance-based approaches.   Encourage exposure to experiences previously avoided due to fear of pain.   Anticipate referral to pain management specialist, physical therapist, addiction specialist (if history of substance use), psychotherapist; advocate for consultation with pharmacist.   Initiate nonpharmacologic measures, such as cognitive behavior therapy, mindfulness, guided " imagery, massage, distraction, relaxation, chiropractic manipulation, dietary supplements or acupuncture.   Provide multimodal treatment interventions, such as physical activity, therapeutic exercise, yoga, TENS (transcutaneous electrical nerve stimulation) and manual therapy.   Train in functional activity modifications, such as body mechanics, posture, ergonomics, energy conservation and activity pacing.   Encourage use of local anesthetic or analgesic therapy as an adjunct for pain control (e.g., lidocaine patch, capsaicin cream, topical nonsteroidal anti-inflammatory drugs).   Prepare patient for use of pharmacologic therapy in a stepped approach that may include acetaminophen, nonsteroidal anti-inflammatory drugs, opioid, antiepileptic, antidepressant or nonbenzodiazepine muscle relaxant.   Review efficacy, tolerability, adherence and manage medication-induced side effects.    Notes:            Task Due Date Last Modified     Alleviate Barriers to Chronic Pain Management --  12/15/2023  2:22 PM by Clark Conte RN     Care Management Activities:      - pain assessed      Notes:                Problem Priority Last Modified     Harm or Injury (Chronic Pain) --  12/15/2023  2:19 PM by Clark Conte RN              Goal Recent Progress Last Modified     Harm or Injury Prevented --  12/15/2023  2:19 PM by Clark Conte RN     Evidence-based guidance:   Address risk for personal injury, such as falls, motor vehicle accidents, self-harm due to medication side effects, compromised mobility or diminished perception, reasoning, decision-making and judgment.   Engage family in providing a safe home environment and closely monitoring changes in the patient's level of sedation and emotional state, such as negativity, hopelessness and suicidal ideation.   Explore suicidal tendencies compassionately, yet directly, by asking about suicidal ideation, attempt history and family history.   Maintain frequent,  structured and supportive contact, such as by phone, office or home visit; collaborate closely with behavioral health specialists or psychiatry.   Make immediate arrangements for evaluation at emergency department, community mental health agency or other psychiatric service when patient expresses positive suicidal ideation with a plan and access to lethal means.   Promote fall risk prevention by making home and environmental adjustments; provide clear instructions regarding ability to drive.   Assess for opioid-induced constipation; provide anticipatory guidance regarding prevention when beginning opioid use by using stool softener or laxative, as well as increasing fluids and dietary fiber.   When opioid-induced constipation is noted, optimize lifestyle interventions and anticipate the use of opioid antagonist as well as tapering, discontinuation or change of opioid.   Encourage frequent oral hygiene (brushing and rinsing), the use of xylitol-containing gum as well as sugar-free and decaffeinated beverages when dry mouth is reported.   Assess for signs and symptoms of opioid endocrinopathy, such as sexual dysfunction, decreased libido, osteoporosis, osteopenia or infertility.   When endocrinopathy is present, anticipate changing opioid medication, tapering or discontinuation of opioid or initiation of hormone supplementation.   Assess for signs/symptoms of sleep-disordered breathing.   Anticipate referral for sleep study and potential tapering or discontinuation of opioid and initiation of noninvasive positive pressure breathing or adaptive servo ventilation device.   Evaluate risk of opioid misuse prior to or early in treatment with opioids.   Provide anticipatory guidance regarding use and misuse of opioid medication, including not crushing pills, dissolving in juice or mixing with applesauce; consider change to crush-resistant opioid.   Complete periodic screening for opioid misuse and/or signs of substance  tolerance (increased dose to reach desired effect, decreased effect with same dose).   Monitor drug-taking behaviors, such as hoarding medication, independently increasing dose, using for other than analgesia and seeing multiple physicians for prescriptions; review state prescription drug monitoring database.   Consider written agreement if patient has used opioids for more than 30 days or episodically over 1 year; required use of nonpharmacologic therapy, urine testing, pill counting, banning sharing or selling of opioids.   When tapering or stopping opioids, frame the discussion in terms of safety and efficacy; reaffirm commitment to patient's health and new treatment plan; respond to fear with empathy; maintain consistent message and approach.    Notes:            Task Due Date Last Modified     Identify and Reduce Risks for Harm or Injury --  12/15/2023  2:22 PM by Clark Conte RN     Care Management Activities:      - not discussed during this outreach      Notes:                     Diabetes Type 2 (Adult)           Problem Priority Last Modified     Glycemic Management (Diabetes, Type 2) --  12/15/2023  2:20 PM by Clark Conte RN              Goal Recent Progress Last Modified     Glycemic Management Optimized --  12/15/2023  2:20 PM by Clark Conte RN     Evidence-based guidance:   Anticipate A1C testing (point-of-care) every 3 to 6 months based on goal attainment.   Review mutually-set A1C goal or target range.   Anticipate use of antihyperglycemic with or without insulin and periodic adjustments; consider active involvement of pharmacist.   Provide medical nutrition therapy and development of individualized eating.   Compare self-reported symptoms of hypo or hyperglycemia to blood glucose levels, diet and fluid intake, current medications, psychosocial and physiologic stressors, change in activity and barriers to care adherence.   Promote self-monitoring of blood glucose levels.   Assess and  address barriers to management plan, such as food insecurity, age, developmental ability, depression, anxiety, fear of hypoglycemia or weight gain, as well as medication cost, side effects and complicated regimen.   Consider referral to community-based diabetes education program, visiting nurse, community health worker or health .   Encourage regular dental care for treatment of periodontal disease; refer to dental provider when needed.    Notes:            Task Due Date Last Modified     Alleviate Barriers to Glycemic Management --  12/15/2023  2:23 PM by Clark Conte RN     Care Management Activities:      - not discussed during this outreach      Notes:                Problem Priority Last Modified     Disease Progression (Diabetes, Type 2) --  12/15/2023  2:20 PM by Clark Conte RN              Goal Recent Progress Last Modified     Disease Progression Prevented or Minimized --  12/15/2023  2:20 PM by Clark Conte RN     Evidence-based guidance:   Prepare patient for laboratory and diagnostic exams based on risk and presentation.   Encourage lifestyle changes, such as increased intake of plant-based foods, stress reduction, consistent physical activity and smoking cessation to prevent long-term complications and chronic disease.    Individualize activity and exercise recommendations while considering potential limitations, such as neuropathy, retinopathy or the ability to prevent hyperglycemia or hypoglycemia.    Prepare patient for use of pharmacologic therapy that may include antihypertensive, analgesic, prostaglandin E1 with periodic adjustments, based on presenting chronic condition and laboratory results.   Assess signs/symptoms and risk factors for hypertension, sleep-disordered breathing, neuropathy (including changes in gait and balance), retinopathy, nephropathy and sexual dysfunction.   Address pregnancy planning and contraceptive choice, especially when prescribing antihypertensive  or statin.   Ensure completion of annual comprehensive foot exam and dilated eye exam.    Implement additional individualized goals and interventions based on identified risk factors.   Prepare patient for consultation or referral for specialist care, such as ophthalmology, neurology, cardiology, podiatry, nephrology or perinatology.    Notes:            Task Due Date Last Modified     Monitor and Manage Follow-up for Comorbidities --  12/15/2023  2:24 PM by Clark Conte RN     Care Management Activities:      - healthy lifestyle promoted      Notes:                          Current Specialty Plan of Care Status signed by both patient and provider    Instructions   Patient was provided an electronic copy of care plan  CCM services were explained and offered and patient has accepted these services.  Patient has given their written consent to receive CCM services and understands that this includes the authorization of electronic communication of medical information with the other treating providers.  Patient understands that they may stop CCM services at any time and these changes will be effective at the end of the calendar month and will effectively revocate the agreement of CCM services.  Patient understands that only one practitioner can furnish and be paid for CCM services during one calendar month.  Patient also understands that there may be co-payment or deductible fees in association with CCM services.  Patient will continue with at least monthly follow-up calls with the Ambulatory .    Clark GONSALVES  Ambulatory Case Management    12/28/2023, 10:06 EST

## 2024-01-02 ENCOUNTER — HOSPITAL ENCOUNTER (OUTPATIENT)
Dept: GENERAL RADIOLOGY | Facility: HOSPITAL | Age: 79
Discharge: HOME | End: 2024-01-02
Admitting: NEUROLOGICAL SURGERY
Payer: MEDICARE

## 2024-01-02 DIAGNOSIS — M54.10 BACK PAIN WITH RIGHT-SIDED RADICULOPATHY: ICD-10-CM

## 2024-01-02 DIAGNOSIS — M71.30 SYNOVIAL CYST: ICD-10-CM

## 2024-01-02 PROCEDURE — 72114 X-RAY EXAM L-S SPINE BENDING: CPT

## 2024-01-04 NOTE — PROGRESS NOTES
Subjective   History of Present Illness: Summer Condon Jr. is a 78 y.o. male is here today for follow-up on back pain. Lumbar XR completed on 1/2/24.  He is doing well today.  He reports resolution of his back pain.  Reports resolution of his neck pain.  Denies any lower extremity pain.  Denies any difficulty walking.    History of Present Illness    The following portions of the patient's history were reviewed and updated as appropriate: allergies, current medications, past family history, past medical history, past social history, past surgical history, and problem list.    Past Medical History:   Diagnosis Date    Abdominal pain of multiple sites     Abdominal pain, RUQ (right upper quadrant)     Acute sinusitis     Allergic rhinitis     Arthritis     Benign essential hypertension     BMI 35.0-35.9,adult     Cholelithiasis     Condyloma acuminata     Cough     Elevated cholesterol     Encounter for postoperative wound check     Flu-like symptoms     Former smoker     smokes 1 to 2 packs per day for 10 years    GERD (gastroesophageal reflux disease)     GI bleed     Hemorrhoids     High blood pressure     High cholesterol     History of allergy     History of cataract     History of colon polyps     History of long-term treatment with high-risk medication     Hyperglycemia     Hyperlipidemia     Hypertension     Influenza A     Internal hemorrhoids with complication     Morbidly obese     Myalgia     Myositis     Nasal congestion     No post-op complications     Prediabetes     Pruritus ani     Skin lesion     Sore throat         Past Surgical History:   Procedure Laterality Date    CATARACT EXTRACTION      CHOLECYSTECTOMY      COLONOSCOPY  11/17/2014    Reid Yousif MD    COLONOSCOPY N/A 10/26/2018    Procedure: COLONOSCOPY TO CECUM AND INTO TERMINAL ILEUM;  Surgeon: Reid Yousif MD;  Location: [facility] ENDOSCOPY;  Service: Gastroenterology    ELBOW ARTHROPLASTY            Current Outpatient Medications:      Accu-Chek Guide test strip, To test blood sugar daily.        DX: E11.9, Disp: 100 each, Rfl: 11    Diclofenac Sodium (Voltaren) 1 % gel gel, Apply 4 g topically to the appropriate area as directed 4 (Four) Times a Day As Needed (pain)., Disp: 70 g, Rfl: 0    Lancets 28G misc, 1 Units Daily., Disp: 100 each, Rfl: 6    lisinopril (PRINIVIL,ZESTRIL) 10 MG tablet, TAKE 1 TABLET EVERY DAY, Disp: 90 tablet, Rfl: 3    metFORMIN (GLUCOPHAGE) 1000 MG tablet, TAKE 1 TABLET TWICE DAILY WITH MEALS, Disp: 180 tablet, Rfl: 0    rosuvastatin (CRESTOR) 5 MG tablet, Take 1 tablet by mouth every night at bedtime., Disp: 90 tablet, Rfl: 6    baclofen (LIORESAL) 10 MG tablet, Take 1 tablet by mouth 3 (Three) Times a Day., Disp: 30 tablet, Rfl: 0    gabapentin (NEURONTIN) 100 MG capsule, Take 1 capsule by mouth 3 (Three) Times a Day. (Patient not taking: Reported on 1/8/2024), Disp: 90 capsule, Rfl: 0    HYDROcodone-acetaminophen (NORCO) 5-325 MG per tablet, Take 1 tablet by mouth Every 4 (Four) Hours As Needed for Moderate Pain or Severe Pain. (Patient not taking: Reported on 1/8/2024), Disp: 16 tablet, Rfl: 0    Ibuprofen 3 %, Gabapentin 10 %, Baclofen 2 %, lidocaine 4 %, Apply 1-2 g topically to the appropriate area as directed 3 (Three) to 4 (Four) times daily. (Patient not taking: Reported on 1/8/2024), Disp: 90 g, Rfl: 0    lidocaine (LIDODERM) 5 %, Place 1 patch on the skin as directed by provider Daily. Remove & Discard patch within 12 hours or as directed by MD (Patient not taking: Reported on 1/8/2024), Disp: 12 each, Rfl: 0    meloxicam (MOBIC) 7.5 MG tablet, TAKE 2 TABLETS EVERY DAY (Patient not taking: Reported on 1/8/2024), Disp: 180 tablet, Rfl: 1    methocarbamol (ROBAXIN) 750 MG tablet, Take 1 tablet by mouth 3 (Three) Times a Day As Needed for Muscle Spasms. (Patient not taking: Reported on 1/8/2024), Disp: 16 tablet, Rfl: 0     Allergies   Allergen Reactions    Ibuprofen Rash    Lipitor [Atorvastatin] Unknown (See  "Comments)     cramps    Zocor [Simvastatin] Myalgia        Social History     Socioeconomic History    Marital status:    Tobacco Use    Smoking status: Former     Packs/day: 1.00     Years: 5.00     Additional pack years: 0.00     Total pack years: 5.00     Types: Cigarettes     Start date:      Quit date:      Years since quittin.0    Smokeless tobacco: Never    Tobacco comments:     smokes 1 to 2 packs per day for 10 years   Substance and Sexual Activity    Alcohol use: Yes     Alcohol/week: 1.0 standard drink of alcohol     Types: 1 Cans of beer per week     Comment: drinks 7 or less drinks per week    Drug use: No    Sexual activity: Not Currently     Partners: Female        Family History   Problem Relation Age of Onset    Arthritis Father     No Known Problems Other         Review of Systems   Musculoskeletal:  Positive for back pain (R thigh burning) and neck stiffness. Negative for neck pain.   Neurological:  Negative for weakness and headaches.       Objective     Vitals:    24 1439   BP: 138/86   Pulse: 68   Temp: 97.8 °F (36.6 °C)   SpO2: 100%   Weight: 109 kg (240 lb 9.6 oz)   Height: 177.8 cm (70\")     Body mass index is 34.52 kg/m².      Physical Exam  Neurologic Exam  Awake, alert, oriented x3  Pupils equal round reactive to light  Extraocular muscles intact  Face symmetric  Speech is fluent and clear  No pronator drift  Motor exam  Bilateral deltoids 5/5, bilateral biceps 5/5, bilateral triceps 5/5, bilateral wrist extension 5/5 bilateral hand  5/5  Bilateral hip flexion 5/5, bilateral knee extension 5/5, bilateral DF/PF 5/5  No clonus  No Raven's reflex  Steady normal gait  Able to detect  light touch in all 4 extremities          Assessment & Plan   Independent Review of Radiographic Studies:      I personally reviewed the images from the following studies.  CT cervical spine from 2023, MRI of the lumbar spine from 2023  The CT of the " cervical spine shows no evidence of fracture.  The MRI of the cervical spine shows a right-sided synovial cyst at the L4-5 level with an associated grade 1 spondylolisthesis and facet hypertrophy.    Medical Decision Makin-year-old male recently seen in the ER by the neurosurgery service on 2023 for severe neck pain  -He reports that the neck pain resolved shortly after being seen in the emergency room.  He denies any changes in strength or sensation in his upper or lower extremities.  He was previously seen for an L4-5 synovial cyst and lumbar radiculopathy.  He denies any changes in his back pain and denies any recurrence of his right lower extremity pain.  He was previously seen by physical therapy and reports that the pain resolved after working with physical therapy  -I will plan to have him follow-up as needed with any recurrence of his symptoms.  -He has taken a break from golfing due to his back and neck pain.  He plans to resume golfing.  I have asked him to start slow and to call back if he develops any new symptoms  Diagnoses and all orders for this visit:    1. Back pain with right-sided radiculopathy (Primary)    2. Synovial cyst    3. Neck pain      Return if symptoms worsen or fail to improve.

## 2024-01-06 DIAGNOSIS — E11.9 TYPE 2 DIABETES MELLITUS WITHOUT COMPLICATION, WITHOUT LONG-TERM CURRENT USE OF INSULIN: ICD-10-CM

## 2024-01-06 DIAGNOSIS — I10 ESSENTIAL HYPERTENSION: ICD-10-CM

## 2024-01-08 ENCOUNTER — OFFICE VISIT (OUTPATIENT)
Dept: NEUROSURGERY | Facility: CLINIC | Age: 79
End: 2024-01-08
Payer: MEDICARE

## 2024-01-08 VITALS
SYSTOLIC BLOOD PRESSURE: 138 MMHG | DIASTOLIC BLOOD PRESSURE: 86 MMHG | BODY MASS INDEX: 34.44 KG/M2 | TEMPERATURE: 97.8 F | HEIGHT: 70 IN | HEART RATE: 68 BPM | WEIGHT: 240.6 LBS | OXYGEN SATURATION: 100 %

## 2024-01-08 DIAGNOSIS — M71.30 SYNOVIAL CYST: ICD-10-CM

## 2024-01-08 DIAGNOSIS — M54.10 BACK PAIN WITH RIGHT-SIDED RADICULOPATHY: Primary | ICD-10-CM

## 2024-01-08 DIAGNOSIS — M54.2 NECK PAIN: ICD-10-CM

## 2024-01-08 PROCEDURE — 1160F RVW MEDS BY RX/DR IN RCRD: CPT | Performed by: NEUROLOGICAL SURGERY

## 2024-01-08 PROCEDURE — 99214 OFFICE O/P EST MOD 30 MIN: CPT | Performed by: NEUROLOGICAL SURGERY

## 2024-01-08 PROCEDURE — 3075F SYST BP GE 130 - 139MM HG: CPT | Performed by: NEUROLOGICAL SURGERY

## 2024-01-08 PROCEDURE — 3079F DIAST BP 80-89 MM HG: CPT | Performed by: NEUROLOGICAL SURGERY

## 2024-01-08 PROCEDURE — 1159F MED LIST DOCD IN RCRD: CPT | Performed by: NEUROLOGICAL SURGERY

## 2024-01-08 RX ORDER — LISINOPRIL 10 MG/1
TABLET ORAL
Qty: 90 TABLET | Refills: 3 | Status: SHIPPED | OUTPATIENT
Start: 2024-01-08

## 2024-01-09 ENCOUNTER — PATIENT OUTREACH (OUTPATIENT)
Dept: CASE MANAGEMENT | Facility: OTHER | Age: 79
End: 2024-01-09
Payer: MEDICARE

## 2024-01-09 DIAGNOSIS — E11.9 TYPE 2 DIABETES MELLITUS WITHOUT COMPLICATION, WITHOUT LONG-TERM CURRENT USE OF INSULIN: ICD-10-CM

## 2024-01-09 DIAGNOSIS — I10 ESSENTIAL HYPERTENSION: Primary | ICD-10-CM

## 2024-01-09 DIAGNOSIS — E78.2 MIXED HYPERLIPIDEMIA: ICD-10-CM

## 2024-01-09 NOTE — OUTREACH NOTE
AMBULATORY CASE MANAGEMENT NOTE    Name and Relationship of Patient/Support Person: Summer Condon Jr. - Self    CCM Interim Update    Called and spoke to patient for CCM update. He followed up with Neurosurgery for back and neck pain. He states his pain is actually better and currently managed. He verbalizes understanding on when to report back to Neurosurgeon for any worsening of symptoms.     Patient's goal this year is to lose weight and be more active. Offered support and mentioned successes. He states he has a scale at home and his home weight is different from doctor's office weight since he's being weighted with jacket, phone, keys etc in office. Suggested when he has a doctors appointment to check is weight first at home and bring his weight result to office to compare.     He continues to do exercises and stretching at home he learnt from previous PT sessions and this helps his pain somewhat. His BP reading in Neurosurgeon office is better from his BP in ER visit. Patient states, elevated BP could be from him being stressed and hyped from the ER visit. His BP is normally controlled.    Follow up PCP visit in 2/14.      Education Documentation  Nonpharmacologic Pain Management, taught by Clark Conte, RN at 1/9/2024 12:42 PM.  Learner: Patient  Readiness: Acceptance  Method: Explanation  Response: Verbalizes Understanding    weight management, taught by Clark Conte RN at 1/9/2024 12:42 PM.  Learner: Patient  Readiness: Acceptance  Method: Explanation  Response: Verbalizes Understanding    activity, taught by Clark Conte RN at 1/9/2024 12:42 PM.  Learner: Patient  Readiness: Acceptance  Method: Explanation  Response: Verbalizes Understanding    food/fluid intake, taught by Clark Conte RN at 1/9/2024 12:42 PM.  Learner: Patient  Readiness: Acceptance  Method: Explanation  Response: Verbalizes Understanding    Blood Glucose Monitoring, taught by Clark Conte RN at 1/9/2024 12:42  PM.  Learner: Patient  Readiness: Acceptance  Method: Explanation  Response: Verbalizes Understanding          Clark GONSALVES  Ambulatory Case Management    1/9/2024, 12:42 EST

## 2024-01-30 ENCOUNTER — PATIENT OUTREACH (OUTPATIENT)
Dept: CASE MANAGEMENT | Facility: OTHER | Age: 79
End: 2024-01-30
Payer: MEDICARE

## 2024-01-30 DIAGNOSIS — E11.9 TYPE 2 DIABETES MELLITUS WITHOUT COMPLICATION, WITHOUT LONG-TERM CURRENT USE OF INSULIN: ICD-10-CM

## 2024-01-30 DIAGNOSIS — E78.2 MIXED HYPERLIPIDEMIA: ICD-10-CM

## 2024-01-30 DIAGNOSIS — I10 ESSENTIAL HYPERTENSION: Primary | ICD-10-CM

## 2024-01-30 NOTE — OUTREACH NOTE
[facility] End of Month Documentation    This Chronic Medical Management Care Plan for Summer Condon Jr., 78 y.o. male, has been monitored and managed; reviewed and a new plan of care implemented for the month of January.  A cumulative time of 22  minutes was spent on this patient record this month, including chart review; phone call with patient, F/U Pain management, BP reading, Diet and exercise. Blood glucose home checks. Goals this year: To lose weight and be more active..    Regarding the patient's problems: has Right upper quadrant abdominal pain; Hemorrhage of anus and rectum; GI bleed; Morbidly obese; Essential hypertension; Hyperlipidemia; Medicare annual wellness visit, subsequent; Type 2 diabetes mellitus without complication, without long-term current use of insulin; Muscle cramps; High risk medication use; Loss or death of partner; Acquired hammer toe of left foot; Hammer toe; and BMI 35.0-35.9,adult on their problem list., the following items were addressed: medical records; medications and any changes can be found within the plan section of the note.  A detailed listing of time spent for chronic care management is tracked within each outreach encounter.  Current medications include:  has a current medication list which includes the following prescription(s): accu-chek guide, baclofen, diclofenac sodium, gabapentin, hydrocodone-acetaminophen, rx alternatives neuropathic pain, lancets 28g, lidocaine, lisinopril, meloxicam, metformin, methocarbamol, and rosuvastatin. and the patient is reported to be patient is compliant with medication protocol,  Medications are reported to be effective.  Regarding these diagnoses, referrals were made to the following provider(s):  n/a.  All notes on chart for PCP to review.    The patient was monitored remotely for blood pressure; weight; activity level; blood glucose; medications; pain.    The patient's physical needs include:  physical healthcare.     The patient's mental  support needs include:  needs met    The patient's cognitive support needs include:  needs met    The patient's psychosocial support needs include:  continued support    The patient's functional needs include: physical healthcare    The patient's environmental needs include:  not applicable, n/a    Care Plan overall comments:  No data recorded    Refer to previous outreach notes for more information on the areas listed above.    Monthly Billing Diagnoses  (I10) Essential hypertension    (E78.2) Mixed hyperlipidemia    (E11.9) Type 2 diabetes mellitus without complication, without long-term current use of insulin    Medications   Medications have been reconciled    Care Plan progress this month:      Recently Modified Care Plans Updates made since 12/30/2023 12:00 AM       Chronic Pain (Adult)           Problem Priority Last Modified     Pain Management Plan (Chronic Pain) --  12/15/2023  2:19 PM by Clark Conte RN              Goal Recent Progress Last Modified     Pain Management Plan Developed --  12/15/2023  2:19 PM by Clark Conte RN     Evidence-based guidance:   Acknowledge patient as the expert in pain self-management.   Partner to set a comfort goal that allows for return to work, school, usual activity and acceptable quality of life.   Assess pain level, usual behavior with and without pain and symptoms that commonly occur with chronic pain, such as fatigue, sleep problems, cognitive and mood disturbance; use a consistent pain scale.   Determine if pain is associated with mobility or at rest, location, intensity, frequency, duration, recurrence, pattern, triggers, relieving factors and description, such as cramping, burning or aching.    Mutually develop an interdisciplinary, multi-modal and detailed pain management plan with patient and family or caregiver; track the patient's response to pain management and adjust plan as needed.    Notes:            Task Due Date Last Modified     Partner to  "Develop Chronic Pain Management Plan --  1/9/2024 12:32 PM by Clark Conte RN     Care Management Activities:      - pain assessed  - pain management plan developed  - pain treatment goals reviewed  - patient response to treatment assessed      Notes:                Problem Priority Last Modified     Chronic Pain Management (Chronic Pain) --  12/15/2023  2:19 PM by Clark Conte RN              Goal Recent Progress Last Modified     Chronic Pain Managed --  12/15/2023  2:19 PM by Clark Conte RN     Evidence-based guidance:   Address common beliefs about pain, such as pain is to be endured, a normal part of aging or that it is not \"real\"; feelings of resignation that nothing can be done and that complaining will be a sign of weakness.   Assess pain level, treatment efficacy and patient response at regular intervals using a consistent pain scale.   Assess pain using self-report (most reliable), family/caregiver report, validated pain scale; consider impact on quality of life.   Determine if pain is associated with mobility or at rest, location, intensity, frequency, duration, recurrence, pattern and description (e.g., cramping, burning, aching), triggers and relieving factors.    Anticipate referral to pain education program, pain management support or community resources for specific diagnoses (e.g., cancer, fibromyalgia, multiple sclerosis).   Explore fears associated with anticipated or imagined pain; encourage acceptance-based approaches.   Encourage exposure to experiences previously avoided due to fear of pain.   Anticipate referral to pain management specialist, physical therapist, addiction specialist (if history of substance use), psychotherapist; advocate for consultation with pharmacist.   Initiate nonpharmacologic measures, such as cognitive behavior therapy, mindfulness, guided imagery, massage, distraction, relaxation, chiropractic manipulation, dietary supplements or acupuncture. "   Provide multimodal treatment interventions, such as physical activity, therapeutic exercise, yoga, TENS (transcutaneous electrical nerve stimulation) and manual therapy.   Train in functional activity modifications, such as body mechanics, posture, ergonomics, energy conservation and activity pacing.   Encourage use of local anesthetic or analgesic therapy as an adjunct for pain control (e.g., lidocaine patch, capsaicin cream, topical nonsteroidal anti-inflammatory drugs).   Prepare patient for use of pharmacologic therapy in a stepped approach that may include acetaminophen, nonsteroidal anti-inflammatory drugs, opioid, antiepileptic, antidepressant or nonbenzodiazepine muscle relaxant.   Review efficacy, tolerability, adherence and manage medication-induced side effects.    Notes:            Task Due Date Last Modified     Alleviate Barriers to Chronic Pain Management --  1/9/2024 12:33 PM by Clark Conte RN     Care Management Activities:      - effectiveness of pharmacologic therapy monitored  - pain assessed  - pain treatment goals reviewed      Notes:                Problem Priority Last Modified     Harm or Injury (Chronic Pain) --  12/15/2023  2:19 PM by Clark Conte RN              Goal Recent Progress Last Modified     Harm or Injury Prevented --  12/15/2023  2:19 PM by Clark Conte RN     Evidence-based guidance:   Address risk for personal injury, such as falls, motor vehicle accidents, self-harm due to medication side effects, compromised mobility or diminished perception, reasoning, decision-making and judgment.   Engage family in providing a safe home environment and closely monitoring changes in the patient's level of sedation and emotional state, such as negativity, hopelessness and suicidal ideation.   Explore suicidal tendencies compassionately, yet directly, by asking about suicidal ideation, attempt history and family history.   Maintain frequent, structured and supportive  contact, such as by phone, office or home visit; collaborate closely with behavioral health specialists or psychiatry.   Make immediate arrangements for evaluation at emergency department, community mental health agency or other psychiatric service when patient expresses positive suicidal ideation with a plan and access to lethal means.   Promote fall risk prevention by making home and environmental adjustments; provide clear instructions regarding ability to drive.   Assess for opioid-induced constipation; provide anticipatory guidance regarding prevention when beginning opioid use by using stool softener or laxative, as well as increasing fluids and dietary fiber.   When opioid-induced constipation is noted, optimize lifestyle interventions and anticipate the use of opioid antagonist as well as tapering, discontinuation or change of opioid.   Encourage frequent oral hygiene (brushing and rinsing), the use of xylitol-containing gum as well as sugar-free and decaffeinated beverages when dry mouth is reported.   Assess for signs and symptoms of opioid endocrinopathy, such as sexual dysfunction, decreased libido, osteoporosis, osteopenia or infertility.   When endocrinopathy is present, anticipate changing opioid medication, tapering or discontinuation of opioid or initiation of hormone supplementation.   Assess for signs/symptoms of sleep-disordered breathing.   Anticipate referral for sleep study and potential tapering or discontinuation of opioid and initiation of noninvasive positive pressure breathing or adaptive servo ventilation device.   Evaluate risk of opioid misuse prior to or early in treatment with opioids.   Provide anticipatory guidance regarding use and misuse of opioid medication, including not crushing pills, dissolving in juice or mixing with applesauce; consider change to crush-resistant opioid.   Complete periodic screening for opioid misuse and/or signs of substance tolerance (increased dose to  reach desired effect, decreased effect with same dose).   Monitor drug-taking behaviors, such as hoarding medication, independently increasing dose, using for other than analgesia and seeing multiple physicians for prescriptions; review state prescription drug monitoring database.   Consider written agreement if patient has used opioids for more than 30 days or episodically over 1 year; required use of nonpharmacologic therapy, urine testing, pill counting, banning sharing or selling of opioids.   When tapering or stopping opioids, frame the discussion in terms of safety and efficacy; reaffirm commitment to patient's health and new treatment plan; respond to fear with empathy; maintain consistent message and approach.    Notes:            Task Due Date Last Modified     Identify and Reduce Risks for Harm or Injury --  1/9/2024 12:36 PM by Clark Conte RN     Care Management Activities:      - strategies to improve or maintain safety promoted  - not discussed during this outreach      Notes:                     Diabetes Type 2 (Adult)           Problem Priority Last Modified     Glycemic Management (Diabetes, Type 2) --  12/15/2023  2:20 PM by Clark Conte RN              Goal Recent Progress Last Modified     Glycemic Management Optimized --  12/15/2023  2:20 PM by Clark Conte RN     Evidence-based guidance:   Anticipate A1C testing (point-of-care) every 3 to 6 months based on goal attainment.   Review mutually-set A1C goal or target range.   Anticipate use of antihyperglycemic with or without insulin and periodic adjustments; consider active involvement of pharmacist.   Provide medical nutrition therapy and development of individualized eating.   Compare self-reported symptoms of hypo or hyperglycemia to blood glucose levels, diet and fluid intake, current medications, psychosocial and physiologic stressors, change in activity and barriers to care adherence.   Promote self-monitoring of blood  glucose levels.   Assess and address barriers to management plan, such as food insecurity, age, developmental ability, depression, anxiety, fear of hypoglycemia or weight gain, as well as medication cost, side effects and complicated regimen.   Consider referral to community-based diabetes education program, visiting nurse, community health worker or health .   Encourage regular dental care for treatment of periodontal disease; refer to dental provider when needed.    Notes:            Task Due Date Last Modified     Alleviate Barriers to Glycemic Management --  1/9/2024 12:38 PM by Clark Conte RN     Care Management Activities:      - blood glucose monitoring encouraged  - blood glucose readings reviewed  - use of blood glucose monitoring log promoted      Notes:   1/9 - Patient checks BG at home. BG trends at 120. BG went up during holiday season but continued to be controlled and patient self monitors. Patient aware of diabetic diet and doing his best to make good food choices.                Problem Priority Last Modified     Disease Progression (Diabetes, Type 2) --  12/15/2023  2:20 PM by Clark Conte RN              Goal Recent Progress Last Modified     Disease Progression Prevented or Minimized --  12/15/2023  2:20 PM by Clark Conte RN     Evidence-based guidance:   Prepare patient for laboratory and diagnostic exams based on risk and presentation.   Encourage lifestyle changes, such as increased intake of plant-based foods, stress reduction, consistent physical activity and smoking cessation to prevent long-term complications and chronic disease.    Individualize activity and exercise recommendations while considering potential limitations, such as neuropathy, retinopathy or the ability to prevent hyperglycemia or hypoglycemia.    Prepare patient for use of pharmacologic therapy that may include antihypertensive, analgesic, prostaglandin E1 with periodic adjustments, based on  presenting chronic condition and laboratory results.   Assess signs/symptoms and risk factors for hypertension, sleep-disordered breathing, neuropathy (including changes in gait and balance), retinopathy, nephropathy and sexual dysfunction.   Address pregnancy planning and contraceptive choice, especially when prescribing antihypertensive or statin.   Ensure completion of annual comprehensive foot exam and dilated eye exam.    Implement additional individualized goals and interventions based on identified risk factors.   Prepare patient for consultation or referral for specialist care, such as ophthalmology, neurology, cardiology, podiatry, nephrology or perinatology.    Notes:            Task Due Date Last Modified     Monitor and Manage Follow-up for Comorbidities --  1/9/2024 12:36 PM by Clark Conte RN     Care Management Activities:      - activity based on tolerance and functional limitations encouraged  - healthy lifestyle promoted  - vital signs and trends reviewed      Notes:                          Instructions   Patient was provided an electronic copy of care plan  CCM services were explained and offered and patient has accepted these services.  Patient has given their written consent to receive CCM services and understands that this includes the authorization of electronic communication of medical information with the other treating providers.  Patient understands that they may stop CCM services at any time and these changes will be effective at the end of the calendar month and will effectively revocate the agreement of CCM services.  Patient understands that only one practitioner can furnish and be paid for CCM services during one calendar month.  Patient also understands that there may be co-payment or deductible fees in association with CCM services.  Patient will continue with at least monthly follow-up calls with the Ambulatory .    Clark GONSALVES  Ambulatory Case  Management    1/30/2024, 12:11 EST

## 2024-02-06 ENCOUNTER — PATIENT OUTREACH (OUTPATIENT)
Dept: CASE MANAGEMENT | Facility: OTHER | Age: 79
End: 2024-02-06
Payer: MEDICARE

## 2024-02-06 DIAGNOSIS — E78.2 MIXED HYPERLIPIDEMIA: ICD-10-CM

## 2024-02-06 DIAGNOSIS — I10 ESSENTIAL HYPERTENSION: Primary | ICD-10-CM

## 2024-02-06 DIAGNOSIS — E11.9 TYPE 2 DIABETES MELLITUS WITHOUT COMPLICATION, WITHOUT LONG-TERM CURRENT USE OF INSULIN: ICD-10-CM

## 2024-02-06 NOTE — OUTREACH NOTE
AMBULATORY CASE MANAGEMENT NOTE    Name and Relationship of Patient/Support Person: Summer Condon Jr. - Self    CCM Interim Update    Called and spoke to patient for CCM update. He states he's doing well and his pain is currently managed. He does not take any pain medication and so far his pain is better to none. He continues to exercise and stay active, he takes his dog Mohawk weaver on a walk. Reviewed with patient future PCP appointment. He states he requested Accu test strips and lancets but have not gotten a refill yet for it, patient verbalizes that he still have enough left until his next appointment and he will just wait until then to request.     Discussed ACP with patient, he states he has the paperwork in his desk but he hasn't filled it out yet. He already have the resource for this. SDOH addressed and reviewed. No urgent needs at this time.       SDOH updated and reviewed with the patient during this program:  Financial Resource Strain: Low Risk  (2/6/2024)    Overall Financial Resource Strain (CARDIA)     Difficulty of Paying Living Expenses: Not hard at all      --     Food Insecurity: No Food Insecurity (2/6/2024)    Hunger Vital Sign     Worried About Running Out of Food in the Last Year: Never true     Ran Out of Food in the Last Year: Never true      --     Housing Stability: Low Risk  (2/6/2024)    Housing Stability Vital Sign     Unable to Pay for Housing in the Last Year: No     Number of Places Lived in the Last Year: 1     Unstable Housing in the Last Year: No      --     Transportation Needs: No Transportation Needs (2/6/2024)    PRAPARE - Transportation     Lack of Transportation (Medical): No     Lack of Transportation (Non-Medical): No         Education Documentation  No documentation found.        Clark GONSALVES  Ambulatory Case Management    2/6/2024, 12:56 EST

## 2024-02-14 ENCOUNTER — OFFICE VISIT (OUTPATIENT)
Dept: FAMILY MEDICINE CLINIC | Facility: CLINIC | Age: 79
End: 2024-02-14
Payer: MEDICARE

## 2024-02-14 DIAGNOSIS — E11.9 TYPE 2 DIABETES MELLITUS WITHOUT COMPLICATION, WITHOUT LONG-TERM CURRENT USE OF INSULIN: ICD-10-CM

## 2024-02-14 DIAGNOSIS — I10 ESSENTIAL HYPERTENSION: Primary | ICD-10-CM

## 2024-02-14 RX ORDER — LANCETS 23 GAUGE
1 EACH MISCELLANEOUS DAILY
Qty: 100 EACH | Refills: 6 | Status: SHIPPED | OUTPATIENT
Start: 2024-02-14

## 2024-02-14 RX ORDER — FEXOFENADINE HCL 180 MG/1
180 TABLET ORAL DAILY
COMMUNITY

## 2024-02-14 RX ORDER — BLOOD SUGAR DIAGNOSTIC
STRIP MISCELLANEOUS
Qty: 100 EACH | Refills: 11 | Status: SHIPPED | OUTPATIENT
Start: 2024-02-14

## 2024-02-15 VITALS
DIASTOLIC BLOOD PRESSURE: 80 MMHG | WEIGHT: 243 LBS | TEMPERATURE: 93.3 F | BODY MASS INDEX: 34.87 KG/M2 | HEART RATE: 71 BPM | SYSTOLIC BLOOD PRESSURE: 154 MMHG | OXYGEN SATURATION: 96 %

## 2024-02-15 LAB
ALBUMIN SERPL-MCNC: 4.4 G/DL (ref 3.8–4.8)
ALBUMIN/GLOB SERPL: 2 {RATIO} (ref 1.2–2.2)
ALP SERPL-CCNC: 57 IU/L (ref 44–121)
ALT SERPL-CCNC: 17 IU/L (ref 0–44)
AST SERPL-CCNC: 21 IU/L (ref 0–40)
BILIRUB SERPL-MCNC: 1.2 MG/DL (ref 0–1.2)
BUN SERPL-MCNC: 19 MG/DL (ref 8–27)
BUN/CREAT SERPL: 17 (ref 10–24)
CALCIUM SERPL-MCNC: 9.2 MG/DL (ref 8.6–10.2)
CHLORIDE SERPL-SCNC: 104 MMOL/L (ref 96–106)
CO2 SERPL-SCNC: 22 MMOL/L (ref 20–29)
CREAT SERPL-MCNC: 1.09 MG/DL (ref 0.76–1.27)
EGFRCR SERPLBLD CKD-EPI 2021: 70 ML/MIN/1.73
GLOBULIN SER CALC-MCNC: 2.2 G/DL (ref 1.5–4.5)
GLUCOSE SERPL-MCNC: 97 MG/DL (ref 70–99)
HBA1C MFR BLD: 6.1 % (ref 4.8–5.6)
POTASSIUM SERPL-SCNC: 5 MMOL/L (ref 3.5–5.2)
PROT SERPL-MCNC: 6.6 G/DL (ref 6–8.5)
SODIUM SERPL-SCNC: 142 MMOL/L (ref 134–144)

## 2024-03-05 ENCOUNTER — TELEPHONE (OUTPATIENT)
Dept: CASE MANAGEMENT | Facility: OTHER | Age: 79
End: 2024-03-05
Payer: MEDICARE

## 2024-03-13 ENCOUNTER — TELEPHONE (OUTPATIENT)
Dept: CASE MANAGEMENT | Facility: OTHER | Age: 79
End: 2024-03-13
Payer: MEDICARE

## 2024-03-13 DIAGNOSIS — I73.9 PERIPHERAL VASCULAR DISEASE, UNSPECIFIED: Primary | ICD-10-CM

## 2024-03-19 ENCOUNTER — PATIENT OUTREACH (OUTPATIENT)
Dept: CASE MANAGEMENT | Facility: OTHER | Age: 79
End: 2024-03-19
Payer: MEDICARE

## 2024-03-19 DIAGNOSIS — M19.90 ARTHRITIS: ICD-10-CM

## 2024-03-19 DIAGNOSIS — E11.9 TYPE 2 DIABETES MELLITUS WITHOUT COMPLICATION, WITHOUT LONG-TERM CURRENT USE OF INSULIN: ICD-10-CM

## 2024-03-19 DIAGNOSIS — I10 ESSENTIAL HYPERTENSION: Primary | ICD-10-CM

## 2024-03-19 NOTE — OUTREACH NOTE
AMBULATORY CASE MANAGEMENT NOTE    Name and Relationship of Patient/Support Person: Belgica Condonons JACKIE Tuttle. - Self    CCM Interim Update    Called and spoke to patient for CCM update. Reviewed patient's last office visit and he states that it went well. BP during the visit was elevated initially at first check, he states that when the MA rechecked BP that it did went down. Patient does not check BP at home, he has no BP device. I will leave a used BP cuff at front office for him to  sometime this week or next week. He continues to check his blood sugars at home. He states that he's been to gatherings and had elevated readings to 140 but that it comes down afterwards. He continues to stay active as much as he can and eating healthier. Patient mentioned that his Pharmacy [facility] had sent a refill request for Metformin and Meloxicam. I informed patient that I don't see a refill request, I will follow up.    Care Coordination    Called [facility] to inquire if they had sent a refill request for Metformin and Meloxicam to our office. Pharmacy tech states that she don't see a refill sent so she resent it.     CCM Interim Update    Informed patient that refill request was resent per [facility] tech. He verbalizes understanding.       Education Documentation  Blood Pressure Monitoring, taught by Clark Conte, RN at 3/19/2024  3:32 PM.  Learner: Patient  Readiness: Acceptance  Method: Explanation  Response: Verbalizes Understanding    Blood Glucose Monitoring, taught by Clark Conte, RN at 3/19/2024  3:32 PM.  Learner: Patient  Readiness: Acceptance  Method: Explanation  Response: Verbalizes Understanding          Clark GONSALVES  Ambulatory Case Management    3/19/2024, 15:32 EDT

## 2024-03-22 RX ORDER — MELOXICAM 7.5 MG/1
15 TABLET ORAL DAILY
Qty: 180 TABLET | Refills: 3 | Status: SHIPPED | OUTPATIENT
Start: 2024-03-22

## 2024-04-16 ENCOUNTER — TELEPHONE (OUTPATIENT)
Dept: CASE MANAGEMENT | Facility: OTHER | Age: 79
End: 2024-04-16
Payer: MEDICARE

## 2024-04-22 ENCOUNTER — PATIENT OUTREACH (OUTPATIENT)
Dept: CASE MANAGEMENT | Facility: OTHER | Age: 79
End: 2024-04-22
Payer: MEDICARE

## 2024-04-22 DIAGNOSIS — E11.9 TYPE 2 DIABETES MELLITUS WITHOUT COMPLICATION, WITHOUT LONG-TERM CURRENT USE OF INSULIN: ICD-10-CM

## 2024-04-22 DIAGNOSIS — I10 ESSENTIAL HYPERTENSION: Primary | ICD-10-CM

## 2024-04-22 NOTE — OUTREACH NOTE
AMBULATORY CASE MANAGEMENT NOTE    Names and Relationships of Patient/Support Persons: Caller: Summer Condon Jr.; Relationship: Self  Caller: Summer Condon Jr.; Relationship: Self -     CCM Interim Update    Called and spoke to patient for CCM update. Patient doing very well. He states used BP cuff I provided is working well and recent BP at home was around 116/67 - 129/78. HR runs 52-55 bpm. Patient is aware to avoid salt and have been keeping an eye on what to eat and what to avoid eating. He is also diabetic, recent A1C at 6.1 controlled last 2/14. He verbalizes that he has all the medications that he needed. Patient's pain has been controlled as well and managed. Patient feel comfortable managing his healthcare and has no urgent further needs at this time. Will switch to monitoring under CCM to graduate.     Education Documentation  No documentation found.        Clark GONSALVES  Ambulatory Case Management    4/22/2024, 12:56 EDT

## 2024-05-15 ENCOUNTER — OFFICE VISIT (OUTPATIENT)
Dept: FAMILY MEDICINE CLINIC | Facility: CLINIC | Age: 79
End: 2024-05-15
Payer: MEDICARE

## 2024-05-15 VITALS
HEART RATE: 60 BPM | SYSTOLIC BLOOD PRESSURE: 128 MMHG | DIASTOLIC BLOOD PRESSURE: 72 MMHG | BODY MASS INDEX: 34.22 KG/M2 | TEMPERATURE: 97.5 F | WEIGHT: 239 LBS | RESPIRATION RATE: 17 BRPM | OXYGEN SATURATION: 97 % | HEIGHT: 70 IN

## 2024-05-15 DIAGNOSIS — E11.9 TYPE 2 DIABETES MELLITUS WITHOUT COMPLICATION, WITHOUT LONG-TERM CURRENT USE OF INSULIN: Primary | ICD-10-CM

## 2024-05-15 DIAGNOSIS — I10 ESSENTIAL HYPERTENSION: ICD-10-CM

## 2024-05-15 DIAGNOSIS — E78.2 MIXED HYPERLIPIDEMIA: ICD-10-CM

## 2024-05-15 DIAGNOSIS — E11.9 TYPE 2 DIABETES MELLITUS WITHOUT COMPLICATION, WITHOUT LONG-TERM CURRENT USE OF INSULIN: ICD-10-CM

## 2024-05-15 PROCEDURE — 99214 OFFICE O/P EST MOD 30 MIN: CPT | Performed by: FAMILY MEDICINE

## 2024-05-15 PROCEDURE — 1126F AMNT PAIN NOTED NONE PRSNT: CPT | Performed by: FAMILY MEDICINE

## 2024-05-15 PROCEDURE — 3078F DIAST BP <80 MM HG: CPT | Performed by: FAMILY MEDICINE

## 2024-05-15 PROCEDURE — 3074F SYST BP LT 130 MM HG: CPT | Performed by: FAMILY MEDICINE

## 2024-05-15 PROCEDURE — G2211 COMPLEX E/M VISIT ADD ON: HCPCS | Performed by: FAMILY MEDICINE

## 2024-05-15 RX ORDER — ROSUVASTATIN CALCIUM 5 MG/1
5 TABLET, COATED ORAL
Qty: 90 TABLET | Refills: 3 | Status: SHIPPED | OUTPATIENT
Start: 2024-05-15

## 2024-05-15 NOTE — PROGRESS NOTES
Chief Complaint   Patient presents with    Hypertension    Diabetes       Subjective   Summer Condon Jr. is a 78 y.o. male.     Hypertension  Pertinent negatives include no blurred vision, chest pain or shortness of breath.   Diabetes  Pertinent negatives for hypoglycemia include no confusion. Pertinent negatives for diabetes include no blurred vision, no chest pain, no fatigue, no polyuria and no weakness.      F/U HTN.  Doing well with meds.    F/U DM2.  BS running 100-115.    The following portions of the patient's history were reviewed and updated as appropriate: allergies, current medications, past family history, past medical history, past social history, past surgical history and problem list.    Review of Systems   Constitutional:  Negative for appetite change and fatigue.   HENT:  Negative for nosebleeds and sore throat.    Eyes:  Negative for blurred vision and visual disturbance.   Respiratory:  Negative for shortness of breath and wheezing.    Cardiovascular:  Negative for chest pain and leg swelling.   Gastrointestinal:  Negative for abdominal distention and abdominal pain.   Endocrine: Negative for cold intolerance and polyuria.   Genitourinary:  Negative for dysuria and hematuria.   Musculoskeletal:  Negative for arthralgias and myalgias.   Skin:  Negative for color change and rash.   Neurological:  Negative for weakness and confusion.   Psychiatric/Behavioral:  Negative for agitation and depressed mood.        Patient Active Problem List   Diagnosis    Right upper quadrant abdominal pain    Hemorrhage of anus and rectum    GI bleed    Morbidly obese    Essential hypertension    Hyperlipidemia    Medicare annual wellness visit, subsequent    Type 2 diabetes mellitus without complication, without long-term current use of insulin    Muscle cramps    High risk medication use    Loss or death of partner    Acquired hammer toe of left foot    Hammer toe    BMI 35.0-35.9,adult       Allergies   Allergen  Reactions    Ibuprofen Rash    Lipitor [Atorvastatin] Unknown (See Comments)     cramps    Zocor [Simvastatin] Myalgia         Current Outpatient Medications:     Accu-Chek Guide test strip, To test blood sugar daily.        DX: E11.9, Disp: 100 each, Rfl: 11    fexofenadine (ALLEGRA) 180 MG tablet, Take 1 tablet by mouth Daily., Disp: , Rfl:     Lancets 28G misc, Use 1 Units Daily., Disp: 100 each, Rfl: 6    lisinopril (PRINIVIL,ZESTRIL) 10 MG tablet, TAKE 1 TABLET EVERY DAY, Disp: 90 tablet, Rfl: 3    meloxicam (MOBIC) 7.5 MG tablet, TAKE 2 TABLETS EVERY DAY, Disp: 180 tablet, Rfl: 3    metFORMIN (GLUCOPHAGE) 1000 MG tablet, TAKE 1 TABLET TWICE DAILY WITH MEALS, Disp: 180 tablet, Rfl: 3    rosuvastatin (CRESTOR) 5 MG tablet, TAKE 1 TABLET AT BEDTIME, Disp: 90 tablet, Rfl: 3    Past Medical History:   Diagnosis Date    Abdominal pain of multiple sites     Abdominal pain, RUQ (right upper quadrant)     Acute sinusitis     Allergic rhinitis     Arthritis     Benign essential hypertension     BMI 35.0-35.9,adult     Cholelithiasis     Condyloma acuminata     Cough     Elevated cholesterol     Encounter for postoperative wound check     Flu-like symptoms     Former smoker     smokes 1 to 2 packs per day for 10 years    GERD (gastroesophageal reflux disease)     GI bleed     Hemorrhoids     High blood pressure     High cholesterol     History of allergy     History of cataract     History of colon polyps     History of long-term treatment with high-risk medication     Hyperglycemia     Hyperlipidemia     Hypertension     Influenza A     Internal hemorrhoids with complication     Morbidly obese     Myalgia     Myositis     Nasal congestion     No post-op complications     Prediabetes     Pruritus ani     Skin lesion     Sore throat        Past Surgical History:   Procedure Laterality Date    CATARACT EXTRACTION      CHOLECYSTECTOMY      COLONOSCOPY  11/17/2014    Reid Yousif MD    COLONOSCOPY N/A 10/26/2018     Procedure: COLONOSCOPY TO CECUM AND INTO TERMINAL ILEUM;  Surgeon: Reid Yousif MD;  Location: [facility] ENDOSCOPY;  Service: Gastroenterology    ELBOW ARTHROPLASTY         Family History   Problem Relation Age of Onset    Arthritis Father     No Known Problems Other        Social History     Tobacco Use    Smoking status: Former     Current packs/day: 0.00     Average packs/day: 1 pack/day for 6.0 years (6.0 ttl pk-yrs)     Types: Cigarettes     Start date:      Quit date:      Years since quittin.4    Smokeless tobacco: Never    Tobacco comments:     smokes 1 to 2 packs per day for 10 years   Substance Use Topics    Alcohol use: Yes     Alcohol/week: 1.0 standard drink of alcohol     Types: 1 Cans of beer per week     Comment: drinks 7 or less drinks per week            Objective     Vitals:    05/15/24 1446   BP: 128/72   Pulse: 60   Resp: 17   Temp: 97.5 °F (36.4 °C)   SpO2: 97%     Body mass index is 34.29 kg/m².    Physical Exam  Vitals reviewed.   Constitutional:       Appearance: He is well-developed. He is not diaphoretic.   HENT:      Head: Normocephalic and atraumatic.   Eyes:      General: No scleral icterus.     Pupils: Pupils are equal, round, and reactive to light.   Neck:      Thyroid: No thyromegaly.   Cardiovascular:      Rate and Rhythm: Normal rate and regular rhythm.      Heart sounds: No murmur heard.     No friction rub. No gallop.   Pulmonary:      Effort: Pulmonary effort is normal. No respiratory distress.      Breath sounds: No wheezing or rales.   Chest:      Chest wall: No tenderness.   Abdominal:      General: Bowel sounds are normal. There is no distension.      Palpations: Abdomen is soft.      Tenderness: There is no abdominal tenderness.   Musculoskeletal:         General: No deformity. Normal range of motion.   Lymphadenopathy:      Cervical: No cervical adenopathy.   Skin:     General: Skin is warm and dry.      Findings: No rash.   Neurological:      Cranial  Nerves: No cranial nerve deficit.      Motor: No abnormal muscle tone.         Lab Results   Component Value Date    GLUCOSE 97 02/14/2024    BUN 19 02/14/2024    CREATININE 1.09 02/14/2024    EGFRIFNONA 73 12/06/2021    EGFRIFAFRI 84 12/06/2021    BCR 17 02/14/2024    K 5.0 02/14/2024    CO2 22 02/14/2024    CALCIUM 9.2 02/14/2024    PROTENTOTREF 6.6 02/14/2024    ALBUMIN 4.4 02/14/2024    LABIL2 2.0 02/14/2024    AST 21 02/14/2024    ALT 17 02/14/2024       WBC   Date Value Ref Range Status   12/14/2023 7.38 3.40 - 10.80 10*3/mm3 Final   01/27/2020 7.64 3.40 - 10.80 10*3/mm3 Final     RBC   Date Value Ref Range Status   12/14/2023 5.12 4.14 - 5.80 10*6/mm3 Final   01/27/2020 5.63 4.14 - 5.80 10*6/mm3 Final     Hemoglobin   Date Value Ref Range Status   12/14/2023 15.2 13.0 - 17.7 g/dL Final     Hematocrit   Date Value Ref Range Status   12/14/2023 44.4 37.5 - 51.0 % Final     MCV   Date Value Ref Range Status   12/14/2023 86.7 79.0 - 97.0 fL Final     MCH   Date Value Ref Range Status   12/14/2023 29.7 26.6 - 33.0 pg Final     MCHC   Date Value Ref Range Status   12/14/2023 34.2 31.5 - 35.7 g/dL Final     RDW   Date Value Ref Range Status   12/14/2023 12.2 (L) 12.3 - 15.4 % Final     RDW-SD   Date Value Ref Range Status   12/14/2023 39.1 37.0 - 54.0 fl Final     MPV   Date Value Ref Range Status   12/14/2023 9.9 6.0 - 12.0 fL Final     Platelets   Date Value Ref Range Status   12/14/2023 251 140 - 450 10*3/mm3 Final     Neutrophil %   Date Value Ref Range Status   12/14/2023 66.3 42.7 - 76.0 % Final     Lymphocyte %   Date Value Ref Range Status   12/14/2023 22.5 19.6 - 45.3 % Final     Monocyte %   Date Value Ref Range Status   12/14/2023 9.5 5.0 - 12.0 % Final     Eosinophil %   Date Value Ref Range Status   12/14/2023 0.7 0.3 - 6.2 % Final     Basophil %   Date Value Ref Range Status   12/14/2023 0.7 0.0 - 1.5 % Final     Immature Grans %   Date Value Ref Range Status   12/14/2023 0.3 0.0 - 0.5 % Final  "    Neutrophils, Absolute   Date Value Ref Range Status   12/14/2023 4.90 1.70 - 7.00 10*3/mm3 Final     Lymphocytes, Absolute   Date Value Ref Range Status   12/14/2023 1.66 0.70 - 3.10 10*3/mm3 Final     Monocytes, Absolute   Date Value Ref Range Status   12/14/2023 0.70 0.10 - 0.90 10*3/mm3 Final     Eosinophils, Absolute   Date Value Ref Range Status   12/14/2023 0.05 0.00 - 0.40 10*3/mm3 Final     Basophils, Absolute   Date Value Ref Range Status   12/14/2023 0.05 0.00 - 0.20 10*3/mm3 Final     Immature Grans, Absolute   Date Value Ref Range Status   12/14/2023 0.02 0.00 - 0.05 10*3/mm3 Final     nRBC   Date Value Ref Range Status   12/14/2023 0.0 0.0 - 0.2 /100 WBC Final       Lab Results   Component Value Date    HGBA1C 6.1 (H) 02/14/2024       No results found for: \"UNGEGDOW48\"    No results found for: \"TSH\"    No results found for: \"CHOL\"  Lab Results   Component Value Date    TRIG 177 (H) 11/14/2023     Lab Results   Component Value Date    HDL 41 11/14/2023     Lab Results   Component Value Date    LDL 96 11/14/2023     Lab Results   Component Value Date    VLDL 31 11/14/2023     Lab Results   Component Value Date    LDLHDL 2.0 08/11/2022         Procedures    Assessment & Plan   Problems Addressed this Visit       Essential hypertension    Hyperlipidemia    Type 2 diabetes mellitus without complication, without long-term current use of insulin - Primary     Diagnoses         Codes Comments    Type 2 diabetes mellitus without complication, without long-term current use of insulin    -  Primary ICD-10-CM: E11.9  ICD-9-CM: 250.00     Essential hypertension     ICD-10-CM: I10  ICD-9-CM: 401.9     Mixed hyperlipidemia     ICD-10-CM: E78.2  ICD-9-CM: 272.2         Dm2.  Controlled.  A1c, CMP, FLP.  Contniue metformin.  Start regular exercise.    HTN.  Controlled. Continue lisinopril.  Check CMP.  Hyperlipidemia.  Check FLP.  Continue rosuvastatin 5 a day.    Encouraged to f/U in 4 months with annual exam.  "     No orders of the defined types were placed in this encounter.      Current Outpatient Medications   Medication Sig Dispense Refill    Accu-Chek Guide test strip To test blood sugar daily.        DX: E11.9 100 each 11    fexofenadine (ALLEGRA) 180 MG tablet Take 1 tablet by mouth Daily.      Lancets 28G misc Use 1 Units Daily. 100 each 6    lisinopril (PRINIVIL,ZESTRIL) 10 MG tablet TAKE 1 TABLET EVERY DAY 90 tablet 3    meloxicam (MOBIC) 7.5 MG tablet TAKE 2 TABLETS EVERY  tablet 3    metFORMIN (GLUCOPHAGE) 1000 MG tablet TAKE 1 TABLET TWICE DAILY WITH MEALS 180 tablet 3    rosuvastatin (CRESTOR) 5 MG tablet TAKE 1 TABLET AT BEDTIME 90 tablet 3     No current facility-administered medications for this visit.       Summer Condon Jr. had no medications administered during this visit.    No follow-ups on file.    There are no Patient Instructions on file for this visit.

## 2024-05-16 LAB
ALBUMIN SERPL-MCNC: 4.6 G/DL (ref 3.5–5.2)
ALBUMIN/GLOB SERPL: 1.6 G/DL
ALP SERPL-CCNC: 62 U/L (ref 39–117)
ALT SERPL-CCNC: 18 U/L (ref 1–41)
AST SERPL-CCNC: 24 U/L (ref 1–40)
BILIRUB SERPL-MCNC: 1.1 MG/DL (ref 0–1.2)
BUN SERPL-MCNC: 19 MG/DL (ref 8–23)
BUN/CREAT SERPL: 18.1 (ref 7–25)
CALCIUM SERPL-MCNC: 9.4 MG/DL (ref 8.6–10.5)
CHLORIDE SERPL-SCNC: 103 MMOL/L (ref 98–107)
CHOLEST SERPL-MCNC: 160 MG/DL (ref 0–200)
CHOLEST/HDLC SERPL: 4 {RATIO}
CO2 SERPL-SCNC: 25.9 MMOL/L (ref 22–29)
CREAT SERPL-MCNC: 1.05 MG/DL (ref 0.76–1.27)
EGFRCR SERPLBLD CKD-EPI 2021: 73.6 ML/MIN/1.73
GLOBULIN SER CALC-MCNC: 2.8 GM/DL
GLUCOSE SERPL-MCNC: 95 MG/DL (ref 65–99)
HBA1C MFR BLD: 6.1 % (ref 4.8–5.6)
HDLC SERPL-MCNC: 40 MG/DL (ref 40–60)
LDLC SERPL CALC-MCNC: 95 MG/DL (ref 0–100)
POTASSIUM SERPL-SCNC: 5.1 MMOL/L (ref 3.5–5.2)
PROT SERPL-MCNC: 7.4 G/DL (ref 6–8.5)
SODIUM SERPL-SCNC: 142 MMOL/L (ref 136–145)
TRIGL SERPL-MCNC: 138 MG/DL (ref 0–150)
VLDLC SERPL CALC-MCNC: 25 MG/DL (ref 5–40)

## 2024-05-21 ENCOUNTER — TELEPHONE (OUTPATIENT)
Dept: CASE MANAGEMENT | Facility: OTHER | Age: 79
End: 2024-05-21
Payer: MEDICARE

## 2024-05-21 NOTE — TELEPHONE ENCOUNTER
Day 30 Chart Review for Monitoring. No events. Continue to monitor to Graduate from [facility].

## 2024-06-19 ENCOUNTER — TELEPHONE (OUTPATIENT)
Dept: CASE MANAGEMENT | Facility: OTHER | Age: 79
End: 2024-06-19
Payer: MEDICARE

## 2024-06-19 NOTE — TELEPHONE ENCOUNTER
Day 60 Chart Review for monitoring to graduate from [facility]. No events. Continue to monitor.

## 2024-07-19 ENCOUNTER — TELEPHONE (OUTPATIENT)
Dept: CASE MANAGEMENT | Facility: OTHER | Age: 79
End: 2024-07-19
Payer: MEDICARE

## 2024-07-19 NOTE — TELEPHONE ENCOUNTER
Day 90 Chart Review. No events. Patient has follow up schedules with providers. Closing CCM program.

## 2024-09-18 ENCOUNTER — OFFICE VISIT (OUTPATIENT)
Dept: FAMILY MEDICINE CLINIC | Facility: CLINIC | Age: 79
End: 2024-09-18
Payer: MEDICARE

## 2024-09-18 VITALS
WEIGHT: 243 LBS | DIASTOLIC BLOOD PRESSURE: 70 MMHG | BODY MASS INDEX: 34.79 KG/M2 | HEIGHT: 70 IN | TEMPERATURE: 97.2 F | OXYGEN SATURATION: 97 % | SYSTOLIC BLOOD PRESSURE: 126 MMHG | HEART RATE: 60 BPM | RESPIRATION RATE: 14 BRPM

## 2024-09-18 DIAGNOSIS — I10 ESSENTIAL HYPERTENSION: ICD-10-CM

## 2024-09-18 DIAGNOSIS — E11.9 TYPE 2 DIABETES MELLITUS WITHOUT COMPLICATION, WITHOUT LONG-TERM CURRENT USE OF INSULIN: ICD-10-CM

## 2024-09-18 DIAGNOSIS — R41.3 MEMORY CHANGE: ICD-10-CM

## 2024-09-18 DIAGNOSIS — L84 CALLUS OF TOE: ICD-10-CM

## 2024-09-18 DIAGNOSIS — M19.90 ARTHRITIS: ICD-10-CM

## 2024-09-18 DIAGNOSIS — Z23 ENCOUNTER FOR IMMUNIZATION: ICD-10-CM

## 2024-09-18 DIAGNOSIS — Z00.00 ENCOUNTER FOR SUBSEQUENT ANNUAL WELLNESS VISIT (AWV) IN MEDICARE PATIENT: Primary | ICD-10-CM

## 2024-09-18 RX ORDER — MELOXICAM 7.5 MG/1
15 TABLET ORAL DAILY
Qty: 180 TABLET | Refills: 3 | Status: SHIPPED | OUTPATIENT
Start: 2024-09-18

## 2024-09-19 LAB
BASOPHILS # BLD AUTO: 0.08 10*3/MM3 (ref 0–0.2)
BASOPHILS NFR BLD AUTO: 1.4 % (ref 0–1.5)
EOSINOPHIL # BLD AUTO: 0.15 10*3/MM3 (ref 0–0.4)
EOSINOPHIL NFR BLD AUTO: 2.6 % (ref 0.3–6.2)
ERYTHROCYTE [DISTWIDTH] IN BLOOD BY AUTOMATED COUNT: 13 % (ref 12.3–15.4)
HBA1C MFR BLD: 6.3 % (ref 4.8–5.6)
HCT VFR BLD AUTO: 46.5 % (ref 37.5–51)
HGB BLD-MCNC: 15.7 G/DL (ref 13–17.7)
IMM GRANULOCYTES # BLD AUTO: 0.03 10*3/MM3 (ref 0–0.05)
IMM GRANULOCYTES NFR BLD AUTO: 0.5 % (ref 0–0.5)
LYMPHOCYTES # BLD AUTO: 2.19 10*3/MM3 (ref 0.7–3.1)
LYMPHOCYTES NFR BLD AUTO: 37.9 % (ref 19.6–45.3)
MCH RBC QN AUTO: 29.3 PG (ref 26.6–33)
MCHC RBC AUTO-ENTMCNC: 33.8 G/DL (ref 31.5–35.7)
MCV RBC AUTO: 86.9 FL (ref 79–97)
MICROALBUMIN UR-MCNC: 3.8 UG/ML
MONOCYTES # BLD AUTO: 0.5 10*3/MM3 (ref 0.1–0.9)
MONOCYTES NFR BLD AUTO: 8.7 % (ref 5–12)
NEUTROPHILS # BLD AUTO: 2.83 10*3/MM3 (ref 1.7–7)
NEUTROPHILS NFR BLD AUTO: 48.9 % (ref 42.7–76)
NRBC BLD AUTO-RTO: 0 /100 WBC (ref 0–0.2)
PLATELET # BLD AUTO: 230 10*3/MM3 (ref 140–450)
RBC # BLD AUTO: 5.35 10*6/MM3 (ref 4.14–5.8)
RPR SER QL: NON REACTIVE
TSH SERPL DL<=0.005 MIU/L-ACNC: 2.38 UIU/ML (ref 0.27–4.2)
VIT B12 SERPL-MCNC: 411 PG/ML (ref 211–946)
WBC # BLD AUTO: 5.78 10*3/MM3 (ref 3.4–10.8)

## 2024-10-26 DIAGNOSIS — E11.9 TYPE 2 DIABETES MELLITUS WITHOUT COMPLICATION, WITHOUT LONG-TERM CURRENT USE OF INSULIN: ICD-10-CM

## 2024-10-26 DIAGNOSIS — I10 ESSENTIAL HYPERTENSION: ICD-10-CM

## 2024-10-28 RX ORDER — LISINOPRIL 10 MG/1
TABLET ORAL
Qty: 90 TABLET | Refills: 3 | Status: SHIPPED | OUTPATIENT
Start: 2024-10-28

## 2025-01-09 DIAGNOSIS — E11.9 TYPE 2 DIABETES MELLITUS WITHOUT COMPLICATION, WITHOUT LONG-TERM CURRENT USE OF INSULIN: ICD-10-CM

## 2025-01-09 NOTE — TELEPHONE ENCOUNTER
LOV                   9/18/2024  NOV                   4/23/2025  Last refill             3/19/24  Protocol             met

## 2025-03-05 DIAGNOSIS — E11.9 TYPE 2 DIABETES MELLITUS WITHOUT COMPLICATION, WITHOUT LONG-TERM CURRENT USE OF INSULIN: ICD-10-CM

## 2025-03-05 RX ORDER — ROSUVASTATIN CALCIUM 5 MG/1
5 TABLET, COATED ORAL
Qty: 90 TABLET | Refills: 3 | Status: SHIPPED | OUTPATIENT
Start: 2025-03-05

## 2025-04-23 ENCOUNTER — OFFICE VISIT (OUTPATIENT)
Dept: FAMILY MEDICINE CLINIC | Facility: CLINIC | Age: 80
End: 2025-04-23
Payer: MEDICARE

## 2025-04-23 ENCOUNTER — TELEPHONE (OUTPATIENT)
Dept: FAMILY MEDICINE CLINIC | Facility: CLINIC | Age: 80
End: 2025-04-23

## 2025-04-23 VITALS
OXYGEN SATURATION: 96 % | SYSTOLIC BLOOD PRESSURE: 134 MMHG | DIASTOLIC BLOOD PRESSURE: 84 MMHG | HEIGHT: 70 IN | BODY MASS INDEX: 35.48 KG/M2 | HEART RATE: 68 BPM | WEIGHT: 247.8 LBS | TEMPERATURE: 98.6 F

## 2025-04-23 DIAGNOSIS — E11.9 TYPE 2 DIABETES MELLITUS WITHOUT COMPLICATION, WITHOUT LONG-TERM CURRENT USE OF INSULIN: Primary | ICD-10-CM

## 2025-04-23 DIAGNOSIS — E78.2 MIXED HYPERLIPIDEMIA: ICD-10-CM

## 2025-04-23 DIAGNOSIS — I10 ESSENTIAL HYPERTENSION: ICD-10-CM

## 2025-04-23 DIAGNOSIS — Z12.5 SCREENING FOR PROSTATE CANCER: ICD-10-CM

## 2025-04-23 DIAGNOSIS — E11.9 TYPE 2 DIABETES MELLITUS WITHOUT COMPLICATION, WITHOUT LONG-TERM CURRENT USE OF INSULIN: ICD-10-CM

## 2025-04-23 PROCEDURE — 3075F SYST BP GE 130 - 139MM HG: CPT | Performed by: STUDENT IN AN ORGANIZED HEALTH CARE EDUCATION/TRAINING PROGRAM

## 2025-04-23 PROCEDURE — 1126F AMNT PAIN NOTED NONE PRSNT: CPT | Performed by: STUDENT IN AN ORGANIZED HEALTH CARE EDUCATION/TRAINING PROGRAM

## 2025-04-23 PROCEDURE — 3079F DIAST BP 80-89 MM HG: CPT | Performed by: STUDENT IN AN ORGANIZED HEALTH CARE EDUCATION/TRAINING PROGRAM

## 2025-04-23 NOTE — TELEPHONE ENCOUNTER
Patient's rx should go to [facility] mail order, please resend the metformin & the pantoprazole 40 mg tab # 90

## 2025-04-23 NOTE — TELEPHONE ENCOUNTER
LOV       4/23/2025  NOV           8/27/2025                Today.  Pt. Wanting sent to mail order

## 2025-04-23 NOTE — PROGRESS NOTES
Chief Complaint  Hypertension and Hyperlipidemia    Subjective        Summer Condon Jr. presents to [facility]    History of Present Illness  The patient presents for evaluation of sleep disturbances, diabetes mellitus, acid reflux, and wheezing.    Sleep disturbances continue to be a problem, attributed to an irregular schedule and persistent fatigue since  service. Although unpleasant dreams occur, they do not disrupt sleep. Despite these issues, excessive daytime sleepiness is not reported even with limited sleep duration. Medications to manage dreams have been discussed previously, but concerns about weight gain and energy levels have led to hesitancy in trying them.    Diabetes management includes occasional hypoglycemia, which is managed by consuming a chocolate cookie. Recent dietary indiscretions have led to slightly elevated blood glucose levels, with a reading of 140 post-breakfast on 04/21/2025, the highest in a long time. Concerns about potential hyperglycemia with reduced metformin dosage are expressed. A history of fluctuating diabetes diagnoses is noted, including a glucose tolerance test indicating high levels, but [facility] and with a family doctor did not confirm diabetes. Past consumption of 6 to 8 bottles of Pepsi daily is admitted. Metformin is currently taken, one dose in the morning and another at night.    Mild acid reflux has been experienced recently, believed to be due to late-night activities.    Occasional wheezing is reported, attributed to allergies, resolving within a few minutes without causing breathing difficulties.    A cyst on the vertebrae has not been addressed due to concerns about bad experiences from others who had similar procedures. Follow-up with Dr. Hodges is needed. Golf has not been played for a year due to back pain, with an attempt in October 2024 resulting in significant discomfort. An emergency room visit  "occurred last year due to severe head pain.    SOCIAL HISTORY  He plays cards every Friday, Monday, and Wednesday. He walks his dog at least 3 times a day, sometimes walking about 2 miles.       Objective   Vital Signs:  /84 (BP Location: Left arm, Patient Position: Sitting, Cuff Size: Adult)   Pulse 68   Temp 98.6 °F (37 °C)   Ht 177.8 cm (70\")   Wt 112 kg (247 lb 12.8 oz)   SpO2 96%   BMI 35.56 kg/m²   Estimated body mass index is 35.56 kg/m² as calculated from the following:    Height as of this encounter: 177.8 cm (70\").    Weight as of this encounter: 112 kg (247 lb 12.8 oz).            Physical Exam  Vitals and nursing note reviewed.   Constitutional:       General: He is not in acute distress.     Appearance: Normal appearance. He is not ill-appearing.   HENT:      Head: Normocephalic and atraumatic.      Nose: Nose normal.      Mouth/Throat:      Mouth: Mucous membranes are moist.   Eyes:      Extraocular Movements: Extraocular movements intact.      Conjunctiva/sclera: Conjunctivae normal.   Cardiovascular:      Rate and Rhythm: Normal rate and regular rhythm.      Heart sounds: Normal heart sounds. No murmur heard.     No gallop.   Pulmonary:      Effort: Pulmonary effort is normal. No respiratory distress.      Breath sounds: Normal breath sounds. No stridor. No wheezing, rhonchi or rales.   Chest:      Chest wall: No tenderness.   Skin:     General: Skin is warm and dry.   Neurological:      General: No focal deficit present.      Mental Status: He is alert and oriented to person, place, and time. Mental status is at baseline.   Psychiatric:         Mood and Affect: Mood normal.         Behavior: Behavior normal.          Physical Exam  Respiratory: Clear to auscultation, no wheezing, rales or rhonchi  Cardiovascular: Regular rate and rhythm, no murmurs, rubs, or gallops       Result Review :               Results  Labs   - Blood glucose test: 140 mg/dL after breakfast   - A1c: Always less " than 6.5%          Assessment and Plan   Diagnoses and all orders for this visit:    1. Type 2 diabetes mellitus without complication, without long-term current use of insulin (Primary)  -     Hemoglobin A1c  -     Microalbumin / Creatinine Urine Ratio - Urine, Clean Catch  -     Lipid panel  -     Comprehensive metabolic panel  -     PSA Screen  -     metFORMIN (GLUCOPHAGE) 1000 MG tablet; Take 1 tablet by mouth Daily With Dinner.  Dispense: 180 tablet; Refill: 3    2. BMI 35.0-35.9,adult    3. Essential hypertension  -     Comprehensive metabolic panel    4. Mixed hyperlipidemia  -     Lipid panel    5. Screening for prostate cancer  -     PSA Screen    6. Type 2 diabetes mellitus without complication, without long-term current use of insulin  Comments:  Stable.  Continue current medications.  Labs today  Orders:  -     Hemoglobin A1c  -     Microalbumin / Creatinine Urine Ratio - Urine, Clean Catch  -     Lipid panel  -     Comprehensive metabolic panel  -     PSA Screen  -     metFORMIN (GLUCOPHAGE) 1000 MG tablet; Take 1 tablet by mouth Daily With Dinner.  Dispense: 180 tablet; Refill: 3        Assessment & Plan  1. Sleep disturbances.  - Reports ongoing sleep disturbances, including bad dreams that do not wake him up but leave him feeling drained.  - Prefers to wait on medication for dreams as he can tolerate the current situation and has concerns about potential side effects.    2. Diabetes mellitus.  - A1c levels have consistently remained <6.5 for several years.  - Experiences occasional low blood sugar levels, managed by consuming a chocolate cookie or similar snack.  - Advised to monitor blood sugar levels closely and report any significant changes.  - Dosage of metformin will be reduced to one tablet to be taken with dinner.    3. Acid reflux.  - Reports experiencing acid reflux, which he attributes to staying out late at night.  - No specific treatment plan discussed during this visit.    4.  Wheezing.  - Reports occasional wheezing, which he believes is due to allergies.  - Advised to attempt coughing to alleviate the wheezing.  - If wheezing persists beyond a few minutes, he should inform the clinic.            Follow Up   Return in about 3 months (around 7/23/2025) for Chronic care.  Patient was given instructions and counseling regarding his condition or for health maintenance advice. Please see specific information pulled into the AVS if appropriate.           Patient or patient representative verbalized consent for the use of Ambient Listening during the visit with  Aundrea Snell DO for chart documentation. 4/23/2025  13:21 EDT

## 2025-04-24 LAB
ALBUMIN SERPL-MCNC: 4.4 G/DL (ref 3.8–4.8)
ALBUMIN/CREAT UR: 7 MG/G CREAT (ref 0–29)
ALP SERPL-CCNC: 62 IU/L (ref 44–121)
ALT SERPL-CCNC: 22 IU/L (ref 0–44)
AST SERPL-CCNC: 26 IU/L (ref 0–40)
BILIRUB SERPL-MCNC: 1.3 MG/DL (ref 0–1.2)
BUN SERPL-MCNC: 22 MG/DL (ref 8–27)
BUN/CREAT SERPL: 20 (ref 10–24)
CALCIUM SERPL-MCNC: 9.6 MG/DL (ref 8.6–10.2)
CHLORIDE SERPL-SCNC: 104 MMOL/L (ref 96–106)
CHOLEST SERPL-MCNC: 133 MG/DL (ref 100–199)
CO2 SERPL-SCNC: 17 MMOL/L (ref 20–29)
CREAT SERPL-MCNC: 1.1 MG/DL (ref 0.76–1.27)
CREAT UR-MCNC: 169.2 MG/DL
EGFRCR SERPLBLD CKD-EPI 2021: 69 ML/MIN/1.73
GLOBULIN SER CALC-MCNC: 2.5 G/DL (ref 1.5–4.5)
GLUCOSE SERPL-MCNC: 97 MG/DL (ref 70–99)
HBA1C MFR BLD: 6.7 % (ref 4.8–5.6)
HDLC SERPL-MCNC: 37 MG/DL
LDLC SERPL CALC-MCNC: 68 MG/DL (ref 0–99)
MICROALBUMIN UR-MCNC: 12.3 UG/ML
POTASSIUM SERPL-SCNC: 4.6 MMOL/L (ref 3.5–5.2)
PROT SERPL-MCNC: 6.9 G/DL (ref 6–8.5)
PSA SERPL-MCNC: 1.5 NG/ML (ref 0–4)
SODIUM SERPL-SCNC: 140 MMOL/L (ref 134–144)
TRIGL SERPL-MCNC: 160 MG/DL (ref 0–149)
VLDLC SERPL CALC-MCNC: 28 MG/DL (ref 5–40)

## 2025-04-28 ENCOUNTER — TELEPHONE (OUTPATIENT)
Dept: FAMILY MEDICINE CLINIC | Facility: CLINIC | Age: 80
End: 2025-04-28
Payer: MEDICARE

## 2025-04-28 NOTE — TELEPHONE ENCOUNTER
Patient called back and state he was recently changed to once a day.  But it's been staying around 150ish the last few days.  He wants to know if Dr Snell thinks he should go back to two a day.  He might need a new prescription.  Also, he mentioned talking about having some acid reflux recently and that he used to take pantaprozole 40 mg.  He thought she was going to call some in.

## 2025-04-28 NOTE — TELEPHONE ENCOUNTER
Mr Taurus's BS today was 152 he was switched on his Metformin and he does not think it is working.

## 2025-04-29 DIAGNOSIS — K21.9 GASTROESOPHAGEAL REFLUX DISEASE, UNSPECIFIED WHETHER ESOPHAGITIS PRESENT: Primary | ICD-10-CM

## 2025-04-29 RX ORDER — PANTOPRAZOLE SODIUM 40 MG/1
40 TABLET, DELAYED RELEASE ORAL DAILY
Qty: 90 TABLET | Refills: 0 | Status: SHIPPED | OUTPATIENT
Start: 2025-04-29

## 2025-05-12 ENCOUNTER — OFFICE VISIT (OUTPATIENT)
Dept: FAMILY MEDICINE CLINIC | Facility: CLINIC | Age: 80
End: 2025-05-12
Payer: MEDICARE

## 2025-05-12 VITALS
BODY MASS INDEX: 35.07 KG/M2 | DIASTOLIC BLOOD PRESSURE: 86 MMHG | WEIGHT: 245 LBS | HEART RATE: 76 BPM | TEMPERATURE: 98.2 F | SYSTOLIC BLOOD PRESSURE: 134 MMHG | HEIGHT: 70 IN | OXYGEN SATURATION: 97 %

## 2025-05-12 DIAGNOSIS — J34.89 RHINORRHEA: ICD-10-CM

## 2025-05-12 DIAGNOSIS — J22 LOWER RESPIRATORY INFECTION: ICD-10-CM

## 2025-05-12 DIAGNOSIS — R05.1 ACUTE COUGH: Primary | ICD-10-CM

## 2025-05-12 DIAGNOSIS — R05.8 COUGH WITH SPUTUM: ICD-10-CM

## 2025-05-12 LAB
EXPIRATION DATE: NORMAL
FLUAV AG UPPER RESP QL IA.RAPID: NOT DETECTED
FLUBV AG UPPER RESP QL IA.RAPID: NOT DETECTED
INTERNAL CONTROL: NORMAL
Lab: NORMAL
SARS-COV-2 AG UPPER RESP QL IA.RAPID: NOT DETECTED

## 2025-05-12 RX ORDER — IPRATROPIUM BROMIDE 42 UG/1
2 SPRAY, METERED NASAL 4 TIMES DAILY
Qty: 15 ML | Refills: 1 | Status: SHIPPED | OUTPATIENT
Start: 2025-05-12

## 2025-05-12 RX ORDER — GUAIFENESIN 600 MG/1
1200 TABLET, EXTENDED RELEASE ORAL 2 TIMES DAILY
Qty: 14 TABLET | Refills: 0 | Status: SHIPPED | OUTPATIENT
Start: 2025-05-12 | End: 2025-05-19

## 2025-05-12 RX ORDER — AZITHROMYCIN 250 MG/1
TABLET, FILM COATED ORAL
Qty: 6 TABLET | Refills: 0 | Status: SHIPPED | OUTPATIENT
Start: 2025-05-12 | End: 2025-05-19

## 2025-05-12 NOTE — PROGRESS NOTES
"Chief Complaint  Cough and Nasal Congestion    Subjective        Summer Condon Jr. presents to [facility]    History of Present Illness  The patient presents for evaluation of a sore throat, runny nose, and cough.    He reported the onset of a sore throat last Thursday, which he initially managed by gargling. Despite taking Allegra and his other medications on Friday morning, the sore throat persisted. By Saturday morning, the sore throat had resolved, but he developed rhinorrhea and a productive cough with yellow phlegm. He took Allegra at approximately 5:30 or 6:00 AM on Saturday, but due to persistent rhinorrhea, he took additional doses at 2:00 PM and 8:00 PM. Despite these interventions, his symptoms continued, preventing him from sleeping on Saturday night. On Sunday, he took two doses of Allegra. He also reports that bending over triggers his cough and that he experiences episodes of feeling hot. He has not experienced any fevers or chills. His appetite remains unaffected, although he has attempted to reduce his food intake.    He recalls a similar episode on 02/04/2019, during which he was diagnosed with influenza and received an injection and prescription medication. He also reports a decrease in energy levels over the past two days, which he attributes to his sneezing and coughing. He experienced a brief episode of dyspnea on Saturday morning, which resolved after 10 minutes and has not recurred since.    He also reports a white lesion on the roof of his mouth, for which he has been referred to an oral surgeon for a biopsy.       Objective   Vital Signs:  /86 (BP Location: Left arm, Patient Position: Sitting, Cuff Size: Adult)   Pulse 76   Temp 98.2 °F (36.8 °C)   Ht 177.8 cm (70\")   Wt 111 kg (245 lb)   SpO2 97%   BMI 35.15 kg/m²   Estimated body mass index is 35.15 kg/m² as calculated from the following:    Height as of this encounter: 177.8 cm (70\").    Weight " as of this encounter: 111 kg (245 lb).            Physical Exam  Constitutional:       Appearance: Normal appearance.   HENT:      Head: Normocephalic and atraumatic.      Nose: Congestion present.      Mouth/Throat:        Comments: Mild hypopigmentation   Pulmonary:      Breath sounds: Wheezing present.   Neurological:      Mental Status: He is alert.          Physical Exam  Nose: Slightly inflamed  Mouth/Throat: Mild discoloration on the hard palate  Respiratory: Wheezing noted       Result Review :               Results  Labs   - COVID-19 test: Negative   - Influenza test: Negative          Assessment and Plan   Diagnoses and all orders for this visit:    1. Acute cough (Primary)  -     POCT SARS-CoV-2 Antigen LIZBETH + Flu  -     XR Chest PA & Lateral (In Office)  -     XR Chest PA & Lateral (In Office)    2. Rhinorrhea  -     ipratropium (ATROVENT) 0.06 % nasal spray; Administer 2 sprays into the nostril(s) as directed by provider 4 (Four) Times a Day.  Dispense: 15 mL; Refill: 1  -     XR Chest PA & Lateral (In Office)  -     XR Chest PA & Lateral (In Office)    3. Cough with sputum  -     guaiFENesin (Mucinex) 600 MG 12 hr tablet; Take 2 tablets by mouth 2 (Two) Times a Day.  Dispense: 14 tablet; Refill: 0  -     XR Chest PA & Lateral (In Office)  -     XR Chest PA & Lateral (In Office)    4. Lower respiratory infection  -     azithromycin (Zithromax Z-Robert) 250 MG tablet; Take 2 tablets by mouth on day 1, then 1 tablet daily on days 2-5  Dispense: 6 tablet; Refill: 0        Assessment & Plan  Respiratory Tract Infection   - Reports sore throat beginning on 05/08/2025, improving by 05/10/2025, followed by runny nose and yellow phlegm.  - Wheezing noted upon examination.  - Discussion of symptoms including shortness of breath on 05/10/2025, resolving after 10 minutes.  - X-ray ordered to rule out pneumonia; antibiotics to be prescribed if confirmed.    2. Mild discoloration on the hard palate.  - Mild  discoloration observed on the hard palate.  - Physical exam findings include a very mild discoloration on the hard palate.  - Previously referred to an oral surgeon for biopsy.  - No immediate action required unless further symptoms develop.            Follow Up   Return for Next scheduled follow up.  Patient was given instructions and counseling regarding his condition or for health maintenance advice. Please see specific information pulled into the AVS if appropriate.           Patient or patient representative verbalized consent for the use of Ambient Listening during the visit with  Aundrea Snell DO for chart documentation. 5/14/2025  16:43 EDT

## 2025-05-14 ENCOUNTER — TELEPHONE (OUTPATIENT)
Dept: FAMILY MEDICINE CLINIC | Facility: CLINIC | Age: 80
End: 2025-05-14

## 2025-05-14 NOTE — TELEPHONE ENCOUNTER
PATIENT CALLING IN REGARDS TO XRAY RESULTS.  HE STATES HE STILL HAS CHEST CONGESTION AND HAS A DEEP COUGH.  HE STATES HE CHOKED ON HIS FOOD LAST NIGHT AND TODAY.    HE IS COUGHING UP A LOT OF PHLEGM  HE ALSO STATES HE IS HAVING CRAMPS ON BACK OF THIGH. HE IS DRINKING LOTS OF WATER. NASAL DRIP STOPPED YESTERDAY WITH THE NASAL SPRAY.    PLEASE CALL AND ADVISE  [phone]    [address] ([facility]), [place] - [address] [facility] [phone] [facility] [phone]  [phone]

## 2025-05-14 NOTE — TELEPHONE ENCOUNTER
Informed patient that there are No active disease of the chest. Patient instructed to finish antibiotic and we will reevaluate. He states he has one more day left on it and he still has a lot of congestion in his chest. Informed patient to continue his musinex and I will send dr barker a message on what she recommends next

## 2025-05-19 ENCOUNTER — RESULTS FOLLOW-UP (OUTPATIENT)
Dept: FAMILY MEDICINE CLINIC | Facility: CLINIC | Age: 80
End: 2025-05-19

## 2025-05-19 ENCOUNTER — OFFICE VISIT (OUTPATIENT)
Dept: FAMILY MEDICINE CLINIC | Facility: CLINIC | Age: 80
End: 2025-05-19
Payer: MEDICARE

## 2025-05-19 VITALS
OXYGEN SATURATION: 98 % | SYSTOLIC BLOOD PRESSURE: 136 MMHG | DIASTOLIC BLOOD PRESSURE: 74 MMHG | HEART RATE: 72 BPM | BODY MASS INDEX: 34.22 KG/M2 | HEIGHT: 70 IN | WEIGHT: 239 LBS | TEMPERATURE: 98.2 F

## 2025-05-19 DIAGNOSIS — R06.02 SHORTNESS OF BREATH: Primary | ICD-10-CM

## 2025-05-19 DIAGNOSIS — R25.2 MUSCLE CRAMPS: ICD-10-CM

## 2025-05-19 DIAGNOSIS — R05.2 SUBACUTE COUGH: ICD-10-CM

## 2025-05-19 RX ORDER — IPRATROPIUM BROMIDE AND ALBUTEROL SULFATE 2.5; .5 MG/3ML; MG/3ML
3 SOLUTION RESPIRATORY (INHALATION) EVERY 4 HOURS PRN
Qty: 360 ML | Refills: 0 | Status: SHIPPED | OUTPATIENT
Start: 2025-05-19 | End: 2025-05-21 | Stop reason: SDUPTHER

## 2025-05-19 RX ORDER — METHYLPREDNISOLONE 4 MG/1
TABLET ORAL
Qty: 21 TABLET | Refills: 0 | Status: SHIPPED | OUTPATIENT
Start: 2025-05-19

## 2025-05-19 RX ORDER — IPRATROPIUM BROMIDE AND ALBUTEROL SULFATE 2.5; .5 MG/3ML; MG/3ML
3 SOLUTION RESPIRATORY (INHALATION)
Status: SHIPPED | OUTPATIENT
Start: 2025-05-19

## 2025-05-19 RX ADMIN — IPRATROPIUM BROMIDE AND ALBUTEROL SULFATE 3 ML: 2.5; .5 SOLUTION RESPIRATORY (INHALATION) at 09:24

## 2025-05-19 NOTE — PROGRESS NOTES
"Chief Complaint  Nasal Congestion and Cough (Has gotten worse in the past week)    Subjective        Summer Condon Jr. presents to [facility]    History of Present Illness  The patient presents for evaluation of a cough.    He reports persistent chest discomfort, accompanied by a cessation of nasal drainage. He was seen here on 05/12/2025 and had an x-ray done. He called on 05/14/2025 to inquire about the x-ray results. He attempted to schedule an appointment this morning but was unable to do so due to a fall in the shower. He attributes this incident to his impaired breathing. He has been experiencing a productive cough with green sputum, which initially presented as clear before transitioning to yellow and then green. Despite not having a fever, he has been experiencing shortness of breath. He attempted to undergo a COVID-19 test yesterday but was unable to produce sufficient nasal secretions due to dryness. He made another attempt this morning, which was also unsuccessful. He believed his condition was improving on 05/16/2025, but he ran out of medication on 05/15/2025. He has a nebulizer machine at home. His appetite has been reduced, with his last meal being at 5:00 PM yesterday. He maintains hydration with 5 to 6 bottles of water daily, approximately 2 gallons of sugar-free orange juice since 05/12/2025, sugar-free soft drinks, root beer, milk, and 4 to 5 cups of coffee each morning. He has completed a course of azithromycin and has been using Mucinex and nasal spray. Additionally, he has taken 12 non-drowsy cold and flu tablets and 10 over-the-counter Mucinex tablets.    He also reports experiencing leg cramps, particularly when seated.       Objective   Vital Signs:  /74 (BP Location: Left arm, Patient Position: Sitting, Cuff Size: Adult)   Pulse 72   Temp 98.2 °F (36.8 °C)   Ht 177.8 cm (70\")   Wt 108 kg (239 lb)   SpO2 98%   BMI 34.29 kg/m²   Estimated body mass " "index is 34.29 kg/m² as calculated from the following:    Height as of this encounter: 177.8 cm (70\").    Weight as of this encounter: 108 kg (239 lb).            Physical Exam  Vitals and nursing note reviewed.   Constitutional:       General: He is not in acute distress.     Appearance: Normal appearance. He is not ill-appearing.   HENT:      Head: Normocephalic and atraumatic.      Nose: Nose normal.      Mouth/Throat:      Mouth: Mucous membranes are moist.   Eyes:      Extraocular Movements: Extraocular movements intact.      Conjunctiva/sclera: Conjunctivae normal.   Cardiovascular:      Rate and Rhythm: Normal rate and regular rhythm.      Heart sounds: Normal heart sounds. No murmur heard.     No gallop.   Pulmonary:      Effort: Pulmonary effort is normal. No respiratory distress.      Breath sounds: No stridor. Wheezing, rhonchi and rales present.      Comments: Worse lower lobes, decreased breath sounds RLL   Chest:      Chest wall: No tenderness.   Skin:     General: Skin is warm and dry.   Neurological:      General: No focal deficit present.      Mental Status: He is alert and oriented to person, place, and time. Mental status is at baseline.   Psychiatric:         Mood and Affect: Mood normal.         Behavior: Behavior normal.          Physical Exam  Respiratory: Lungs sound terrible.       Result Review :               Results  Imaging   - X-ray of the chest: Normal          Assessment and Plan   Diagnoses and all orders for this visit:    1. Shortness of breath (Primary)  -     ipratropium-albuterol (DUO-NEB) nebulizer solution 3 mL  -     methylPREDNISolone (MEDROL) 4 MG dose pack; Take as directed on package instructions.  Dispense: 21 tablet; Refill: 0  -     ipratropium-albuterol (DUO-NEB) 0.5-2.5 mg/3 ml nebulizer; Take 3 mL by nebulization Every 4 (Four) Hours As Needed for Wheezing or Shortness of Air.  Dispense: 360 mL; Refill: 0  -     XR Chest PA & Lateral (In Office)    2. Muscle " cramps  -     Comprehensive metabolic panel  -     CK  -     Magnesium    3. Subacute cough  -     CBC w AUTO Differential  -     methylPREDNISolone (MEDROL) 4 MG dose pack; Take as directed on package instructions.  Dispense: 21 tablet; Refill: 0  -     ipratropium-albuterol (DUO-NEB) 0.5-2.5 mg/3 ml nebulizer; Take 3 mL by nebulization Every 4 (Four) Hours As Needed for Wheezing or Shortness of Air.  Dispense: 360 mL; Refill: 0  -     XR Chest PA & Lateral (In Office)        Assessment & Plan  1. Cough, dyspnea   - Persistent coughing with green sputum, shortness of breath, and difficulty taking deep breaths.  - Completed a course of azithromycin and used Mucinex and nasal spray.  - A breathing treatment will be administered today, followed by a reassessment of lung sounds.  - Repeat chest xray was completed and no acute process is identified.  - duoneb administered here which did improve lung sounds  - prescribed duonebs every 4 hours while awake, send steroid taper     2. Leg cramps.  - Experiencing cramping in the back of the leg, particularly when sitting.  - No current physical exam findings indicating cramping.  - Discussed the cramping problem and reviewed hydration status.  - No new medications or therapies prescribed for leg cramps at this time.            Follow Up   Return in about 3 months (around 8/19/2025) for Chronic care.  Patient was given instructions and counseling regarding his condition or for health maintenance advice. Please see specific information pulled into the AVS if appropriate.           Patient or patient representative verbalized consent for the use of Ambient Listening during the visit with  Aundrea Snell DO for chart documentation. 5/21/2025  09:41 EDT

## 2025-05-20 ENCOUNTER — TELEPHONE (OUTPATIENT)
Dept: FAMILY MEDICINE CLINIC | Facility: CLINIC | Age: 80
End: 2025-05-20

## 2025-05-20 ENCOUNTER — TELEPHONE (OUTPATIENT)
Dept: FAMILY MEDICINE CLINIC | Facility: CLINIC | Age: 80
End: 2025-05-20
Payer: MEDICARE

## 2025-05-20 LAB
ALBUMIN SERPL-MCNC: 4.5 G/DL (ref 3.5–5.2)
ALBUMIN/GLOB SERPL: 1.6 G/DL
ALP SERPL-CCNC: 62 U/L (ref 39–117)
ALT SERPL-CCNC: 27 U/L (ref 1–41)
AST SERPL-CCNC: 40 U/L (ref 1–40)
BASOPHILS # BLD AUTO: 0.07 10*3/MM3 (ref 0–0.2)
BASOPHILS NFR BLD AUTO: 1.2 % (ref 0–1.5)
BILIRUB SERPL-MCNC: 1.2 MG/DL (ref 0–1.2)
BUN SERPL-MCNC: 21 MG/DL (ref 8–23)
BUN/CREAT SERPL: 18.6 (ref 7–25)
CALCIUM SERPL-MCNC: 9 MG/DL (ref 8.6–10.5)
CHLORIDE SERPL-SCNC: 104 MMOL/L (ref 98–107)
CK SERPL-CCNC: 25 U/L (ref 20–200)
CO2 SERPL-SCNC: 21.9 MMOL/L (ref 22–29)
CREAT SERPL-MCNC: 1.13 MG/DL (ref 0.76–1.27)
EGFRCR SERPLBLD CKD-EPI 2021: 66.9 ML/MIN/1.73
EOSINOPHIL # BLD AUTO: 0.2 10*3/MM3 (ref 0–0.4)
EOSINOPHIL NFR BLD AUTO: 3.5 % (ref 0.3–6.2)
ERYTHROCYTE [DISTWIDTH] IN BLOOD BY AUTOMATED COUNT: 12.9 % (ref 12.3–15.4)
GLOBULIN SER CALC-MCNC: 2.9 GM/DL
GLUCOSE SERPL-MCNC: 131 MG/DL (ref 65–99)
HCT VFR BLD AUTO: 43.4 % (ref 37.5–51)
HGB BLD-MCNC: 14.5 G/DL (ref 13–17.7)
IMM GRANULOCYTES # BLD AUTO: 0.01 10*3/MM3 (ref 0–0.05)
IMM GRANULOCYTES NFR BLD AUTO: 0.2 % (ref 0–0.5)
LYMPHOCYTES # BLD AUTO: 2.55 10*3/MM3 (ref 0.7–3.1)
LYMPHOCYTES NFR BLD AUTO: 45.1 % (ref 19.6–45.3)
MAGNESIUM SERPL-MCNC: 2.1 MG/DL (ref 1.6–2.4)
MCH RBC QN AUTO: 29.5 PG (ref 26.6–33)
MCHC RBC AUTO-ENTMCNC: 33.4 G/DL (ref 31.5–35.7)
MCV RBC AUTO: 88.4 FL (ref 79–97)
MONOCYTES # BLD AUTO: 0.51 10*3/MM3 (ref 0.1–0.9)
MONOCYTES NFR BLD AUTO: 9 % (ref 5–12)
NEUTROPHILS # BLD AUTO: 2.31 10*3/MM3 (ref 1.7–7)
NEUTROPHILS NFR BLD AUTO: 41 % (ref 42.7–76)
NRBC BLD AUTO-RTO: 0 /100 WBC (ref 0–0.2)
PLATELET # BLD AUTO: 221 10*3/MM3 (ref 140–450)
POTASSIUM SERPL-SCNC: 4.4 MMOL/L (ref 3.5–5.2)
PROT SERPL-MCNC: 7.4 G/DL (ref 6–8.5)
RBC # BLD AUTO: 4.91 10*6/MM3 (ref 4.14–5.8)
SODIUM SERPL-SCNC: 140 MMOL/L (ref 136–145)
WBC # BLD AUTO: 5.65 10*3/MM3 (ref 3.4–10.8)

## 2025-05-20 NOTE — TELEPHONE ENCOUNTER
[facility] pharmacy called to inform that Medicare part B rejected patient's Duo-Neb prescription as it was sent with the wrong code. This is not something that can be changed with a verbal, so the pharmacist is requesting a new prescription with the corrected code. He was not told which code was correct. The pharmacy can be reached at [phone] with any follow up questions.   no

## 2025-05-20 NOTE — TELEPHONE ENCOUNTER
Caller: Summer Condon Jr.    Relationship to patient: Self    Best call back number:      Patient is needing: PATIENT STATES THAT [facility] IS TELLING HIM THERE IS A PROBLEM WITH HIS DUO NEB PRESCRIPTION AND THEY ARE WAITING TO HEAR FROM DR. OCAMPO TO GET IT CORRECTED.  PATIENT IS WANTING TO KNOW IF DR. OCAMPO HAS CALLED THEM AND GOTTEN THAT ISSUE RESOLVED OR WHEN SHE WILL GET IT RESOLVED.   .

## 2025-05-21 DIAGNOSIS — J44.0 ACUTE BRONCHITIS WITH CHRONIC OBSTRUCTIVE PULMONARY DISEASE (COPD): Primary | ICD-10-CM

## 2025-05-21 DIAGNOSIS — J20.9 ACUTE BRONCHITIS WITH CHRONIC OBSTRUCTIVE PULMONARY DISEASE (COPD): Primary | ICD-10-CM

## 2025-05-21 DIAGNOSIS — R05.2 SUBACUTE COUGH: ICD-10-CM

## 2025-05-21 DIAGNOSIS — R06.02 SHORTNESS OF BREATH: ICD-10-CM

## 2025-05-21 RX ORDER — IPRATROPIUM BROMIDE AND ALBUTEROL SULFATE 2.5; .5 MG/3ML; MG/3ML
3 SOLUTION RESPIRATORY (INHALATION) EVERY 4 HOURS PRN
Qty: 360 ML | Refills: 0 | Status: SHIPPED | OUTPATIENT
Start: 2025-05-21

## 2025-05-21 RX ORDER — IPRATROPIUM BROMIDE AND ALBUTEROL SULFATE 2.5; .5 MG/3ML; MG/3ML
3 SOLUTION RESPIRATORY (INHALATION) EVERY 4 HOURS PRN
Qty: 360 ML | Refills: 0 | Status: SHIPPED | OUTPATIENT
Start: 2025-05-21 | End: 2025-05-21

## 2025-05-21 NOTE — TELEPHONE ENCOUNTER
Patients pharmacy is requesting a new disgnosis code for the duo neb to be covered. Gave a code and pharmacy said that it went through but they needed to resend a new rx with it on there. Disgnosis code on desk   Please advise

## 2025-05-21 NOTE — TELEPHONE ENCOUNTER
Caller: Summer Condon Jr.    Relationship: Self    Best call back number:[phone]     What is the best time to reach you: ANYTIME     Who are you requesting to speak with (clinical staff, provider,  specific staff member): CLINICAL    What was the call regarding: PATIENT CALLING TO FOLLOW UP ON THE PREVIOUS MESSAGE FOR THE DUO-NEB.     Is it okay if the provider responds through Ambaturehart: NO

## 2025-05-30 ENCOUNTER — TELEPHONE (OUTPATIENT)
Dept: FAMILY MEDICINE CLINIC | Facility: CLINIC | Age: 80
End: 2025-05-30
Payer: MEDICARE

## 2025-05-30 NOTE — TELEPHONE ENCOUNTER
Caller: Summer Condon Jr.    Relationship to patient: Self    Best call back number:     Patient is needing: PATIENT STATES HE USED TO GET MUSCLE SPASMS/CRAMPS EVERY 3-4 WEEKS, BUT NOW HE IS GETTING THEM A LOT IN HIS LEGS AND IN THAT HIS INDEX FINGER IS LOCKING UP.   PATIENT WOULD LIKE TO KNOW WHAT DR. OCAMPO CAN PRESCRIBE HIM TO ADDRESS HIS MUSCLE SPASMS AND CRAMPS.    HE STATES DR. VOGEL WAS HIS DOCTOR AT THE TIME WHO PRESCRIBED HIM THE MEDICATION TO ADDRESS HIS MUSCLE SPASMS/CRAMPS.    HE STATES HE IS DRINKING A LOT OF POWER LILIYA AND WATER.   PLEASE LET THE PATIENT KNOW IF SOMETHING WILL BE CALLED IN.   PATIENT JUST TOOK HIS LAST PILL PRESCRIBED TO HIM 12/14/2023 FROM THE EMERGENCY ROOM.

## 2025-06-02 RX ORDER — METHOCARBAMOL 750 MG/1
750 TABLET, FILM COATED ORAL 3 TIMES DAILY PRN
Qty: 30 TABLET | Refills: 0 | Status: SHIPPED | OUTPATIENT
Start: 2025-06-02 | End: 2025-06-04 | Stop reason: SDUPTHER

## 2025-06-02 RX ORDER — METHOCARBAMOL 750 MG/1
750 TABLET, FILM COATED ORAL 3 TIMES DAILY PRN
Qty: 30 TABLET | Refills: 0 | Status: SHIPPED | OUTPATIENT
Start: 2025-06-02 | End: 2025-06-02 | Stop reason: SDUPTHER

## 2025-06-04 ENCOUNTER — OFFICE VISIT (OUTPATIENT)
Dept: FAMILY MEDICINE CLINIC | Facility: CLINIC | Age: 80
End: 2025-06-04
Payer: MEDICARE

## 2025-06-04 VITALS
OXYGEN SATURATION: 97 % | BODY MASS INDEX: 33.99 KG/M2 | WEIGHT: 237.4 LBS | TEMPERATURE: 97 F | DIASTOLIC BLOOD PRESSURE: 90 MMHG | SYSTOLIC BLOOD PRESSURE: 128 MMHG | HEART RATE: 67 BPM | HEIGHT: 70 IN

## 2025-06-04 DIAGNOSIS — M54.9 BACK PAIN, UNSPECIFIED BACK LOCATION, UNSPECIFIED BACK PAIN LATERALITY, UNSPECIFIED CHRONICITY: ICD-10-CM

## 2025-06-04 DIAGNOSIS — R25.2 MUSCLE CRAMPS: Primary | ICD-10-CM

## 2025-06-04 DIAGNOSIS — M54.17 LUMBOSACRAL RADICULOPATHY AT L5: ICD-10-CM

## 2025-06-04 RX ORDER — METHOCARBAMOL 750 MG/1
750 TABLET, FILM COATED ORAL 3 TIMES DAILY PRN
Qty: 90 TABLET | Refills: 3 | Status: SHIPPED | OUTPATIENT
Start: 2025-06-04

## 2025-06-04 RX ORDER — TRAMADOL HYDROCHLORIDE 50 MG/1
50 TABLET ORAL EVERY 8 HOURS PRN
Qty: 90 TABLET | Refills: 0 | Status: SHIPPED | OUTPATIENT
Start: 2025-06-04

## 2025-06-04 NOTE — PROGRESS NOTES
"Chief Complaint  leg cramps (Says he is doing better. )    Subjective          History of Present Illness      Leg cramps  Was seen on 05/19/25 by pcp.  Cramps have improved since starting muscle relaxers.  Reports Sitting too long causes pain and it shoots from hamstring and he can't walk for a min when he stand up.  Drinking 2-3 powerades a day.  Denies PT today.  F/u with dr moody .  Tramadol 50mg prn 2023 would like a refill to help with back pain.        Objective   Vital Signs:  /90   Pulse 67   Temp 97 °F (36.1 °C) (Temporal)   Ht 177.8 cm (70\")   Wt 108 kg (237 lb 6.4 oz)   SpO2 97%   BMI 34.06 kg/m²   Estimated body mass index is 34.06 kg/m² as calculated from the following:    Height as of this encounter: 177.8 cm (70\").    Weight as of this encounter: 108 kg (237 lb 6.4 oz).            Physical Exam  Vitals reviewed.   Constitutional:       General: He is not in acute distress.     Appearance: Normal appearance.   HENT:      Head: Normocephalic and atraumatic.      Mouth/Throat:      Mouth: Mucous membranes are moist.      Pharynx: No oropharyngeal exudate or posterior oropharyngeal erythema.   Eyes:      Conjunctiva/sclera: Conjunctivae normal.      Pupils: Pupils are equal, round, and reactive to light.   Cardiovascular:      Rate and Rhythm: Normal rate and regular rhythm.      Pulses: Normal pulses.      Heart sounds: No murmur heard.     No gallop.   Pulmonary:      Effort: Pulmonary effort is normal. No respiratory distress.      Breath sounds: Normal breath sounds. No wheezing.   Abdominal:      General: Bowel sounds are normal. There is no distension.      Palpations: Abdomen is soft.      Tenderness: There is no abdominal tenderness.   Musculoskeletal:         General: Normal range of motion.      Cervical back: Normal range of motion and neck supple. No tenderness.   Skin:     General: Skin is warm and dry.   Neurological:      Mental Status: He is alert and oriented to person, " place, and time. Mental status is at baseline.   Psychiatric:         Mood and Affect: Mood normal.        Result Review :                Assessment and Plan   Diagnoses and all orders for this visit:    1. Muscle cramps (Primary)  -     methocarbamol (ROBAXIN) 750 MG tablet; Take 1 tablet by mouth 3 (Three) Times a Day As Needed for Muscle Spasms.  Dispense: 90 tablet; Refill: 3    2. Lumbosacral radiculopathy at L5  -     traMADol (ULTRAM) 50 MG tablet; Take 1 tablet by mouth Every 8 (Eight) Hours As Needed for Moderate Pain or Severe Pain.  Dispense: 90 tablet; Refill: 0    3. Back pain, unspecified back location, unspecified back pain laterality, unspecified chronicity  -     traMADol (ULTRAM) 50 MG tablet; Take 1 tablet by mouth Every 8 (Eight) Hours As Needed for Moderate Pain or Severe Pain.  Dispense: 90 tablet; Refill: 0      Medications refilled per patient request.  Patient reports muscle relaxers do not make him sleepy.  He will only use the tramadol as needed.  Declined physical therapy today.  Will follow-up with Dr. Reyes outpatient for back pain.  Provided patient with stretches on after visit summary to try at home.  Follow-up if symptoms do not improve.  Follow-up with PCP 8/2025 for chronic disease management.       Follow Up   Return if symptoms worsen or fail to improve.  Patient was given instructions and counseling regarding his condition or for health maintenance advice. Please see specific information pulled into the AVS if appropriate.

## 2025-07-14 DIAGNOSIS — K21.9 GASTROESOPHAGEAL REFLUX DISEASE, UNSPECIFIED WHETHER ESOPHAGITIS PRESENT: ICD-10-CM

## 2025-07-14 RX ORDER — PANTOPRAZOLE SODIUM 40 MG/1
40 TABLET, DELAYED RELEASE ORAL DAILY
Qty: 90 TABLET | Refills: 1 | Status: SHIPPED | OUTPATIENT
Start: 2025-07-14

## 2025-07-14 NOTE — TELEPHONE ENCOUNTER
Rx Refill Note  Requested Prescriptions     Pending Prescriptions Disp Refills    pantoprazole (PROTONIX) 40 MG EC tablet [Pharmacy Med Name: Pantoprazole Sodium Oral Tablet Delayed Release 40 MG] 90 tablet 1     Sig: TAKE 1 TABLET EVERY DAY      Last office visit with prescribing clinician: 5/19/2025   Last telemedicine visit with prescribing clinician: Visit date not found   Next office visit with prescribing clinician: 8/27/2025                         Would you like a call back once the refill request has been completed: [] Yes [] No    If the office needs to give you a call back, can they leave a voicemail: [] Yes [] No    Irasema Kelly MA  07/14/25, 11:32 EDT

## 2025-08-09 DIAGNOSIS — M19.90 ARTHRITIS: ICD-10-CM

## 2025-08-11 RX ORDER — MELOXICAM 7.5 MG/1
15 TABLET ORAL DAILY
Qty: 180 TABLET | Refills: 3 | Status: SHIPPED | OUTPATIENT
Start: 2025-08-11

## 2025-08-13 DIAGNOSIS — I10 ESSENTIAL HYPERTENSION: ICD-10-CM

## 2025-08-13 DIAGNOSIS — E11.9 TYPE 2 DIABETES MELLITUS WITHOUT COMPLICATION, WITHOUT LONG-TERM CURRENT USE OF INSULIN: ICD-10-CM

## 2025-08-13 RX ORDER — LISINOPRIL 10 MG/1
10 TABLET ORAL DAILY
Qty: 90 TABLET | Refills: 3 | Status: SHIPPED | OUTPATIENT
Start: 2025-08-13

## 2025-08-27 ENCOUNTER — OFFICE VISIT (OUTPATIENT)
Dept: FAMILY MEDICINE CLINIC | Facility: CLINIC | Age: 80
End: 2025-08-27
Payer: MEDICARE

## 2025-08-27 VITALS
BODY MASS INDEX: 33.36 KG/M2 | OXYGEN SATURATION: 98 % | HEART RATE: 84 BPM | HEIGHT: 70 IN | SYSTOLIC BLOOD PRESSURE: 126 MMHG | DIASTOLIC BLOOD PRESSURE: 80 MMHG | WEIGHT: 233 LBS

## 2025-08-27 DIAGNOSIS — R25.2 MUSCLE CRAMPS: ICD-10-CM

## 2025-08-27 DIAGNOSIS — M19.90 ARTHRITIS: ICD-10-CM

## 2025-08-27 DIAGNOSIS — R41.3 MEMORY CHANGE: ICD-10-CM

## 2025-08-27 DIAGNOSIS — E11.9 TYPE 2 DIABETES MELLITUS WITHOUT COMPLICATION, WITHOUT LONG-TERM CURRENT USE OF INSULIN: Primary | ICD-10-CM

## 2025-08-27 DIAGNOSIS — G62.9 NEUROPATHY: ICD-10-CM

## 2025-08-27 DIAGNOSIS — K92.2 GASTROINTESTINAL HEMORRHAGE, UNSPECIFIED GASTROINTESTINAL HEMORRHAGE TYPE: ICD-10-CM

## 2025-08-27 PROBLEM — R10.11 RIGHT UPPER QUADRANT ABDOMINAL PAIN: Status: RESOLVED | Noted: 2017-10-10 | Resolved: 2025-08-27

## 2025-08-27 PROBLEM — K62.5 HEMORRHAGE OF ANUS AND RECTUM: Status: RESOLVED | Noted: 2018-10-25 | Resolved: 2025-08-27

## 2025-08-27 PROBLEM — M20.42 ACQUIRED HAMMER TOE OF LEFT FOOT: Status: RESOLVED | Noted: 2022-12-30 | Resolved: 2025-08-27

## 2025-08-27 RX ORDER — MELOXICAM 7.5 MG/1
15 TABLET ORAL DAILY PRN
Qty: 180 TABLET | Refills: 3 | Status: SHIPPED | OUTPATIENT
Start: 2025-08-27

## 2025-08-27 RX ORDER — METHOCARBAMOL 750 MG/1
750 TABLET, FILM COATED ORAL DAILY PRN
Start: 2025-08-27

## 2025-08-27 RX ORDER — GABAPENTIN 100 MG/1
300 CAPSULE ORAL NIGHTLY PRN
Qty: 300 CAPSULE | Refills: 0 | Status: SHIPPED | OUTPATIENT
Start: 2025-08-27

## 2025-08-28 LAB
HBA1C MFR BLD: 6.1 % (ref 4.8–5.6)
HIV 1+2 AB+HIV1 P24 AG SERPL QL IA: NON REACTIVE
RPR SER QL: NON REACTIVE
TSH SERPL DL<=0.005 MIU/L-ACNC: 1.79 UIU/ML (ref 0.45–4.5)
VIT B12 SERPL-MCNC: 360 PG/ML (ref 232–1245)

## (undated) DEVICE — THE TORRENT IRRIGATION SCOPE CONNECTOR IS USED WITH THE TORRENT IRRIGATION TUBING TO PROVIDE IRRIGATION FLUIDS SUCH AS STERILE WATER DURING GASTROINTESTINAL ENDOSCOPIC PROCEDURES WHEN USED IN CONJUNCTION WITH AN IRRIGATION PUMP (OR ELECTROSURGICAL UNIT).: Brand: TORRENT

## (undated) DEVICE — CANNULA,ADULT,SOFT-TOUCH,7'TUBE,UC: Brand: PENDING

## (undated) DEVICE — TUBING, SUCTION, 1/4" X 10', STRAIGHT: Brand: MEDLINE

## (undated) DEVICE — Device: Brand: DEFENDO AIR/WATER/SUCTION AND BIOPSY VALVE